# Patient Record
Sex: MALE | Race: WHITE | Employment: FULL TIME | URBAN - METROPOLITAN AREA
[De-identification: names, ages, dates, MRNs, and addresses within clinical notes are randomized per-mention and may not be internally consistent; named-entity substitution may affect disease eponyms.]

---

## 2018-07-06 ENCOUNTER — HOSPITAL ENCOUNTER (EMERGENCY)
Age: 48
Discharge: HOME | End: 2018-07-06
Attending: EMERGENCY MEDICINE
Payer: COMMERCIAL

## 2018-07-06 VITALS
HEART RATE: 90 BPM | RESPIRATION RATE: 14 BRPM | DIASTOLIC BLOOD PRESSURE: 100 MMHG | SYSTOLIC BLOOD PRESSURE: 166 MMHG | WEIGHT: 100 LBS | OXYGEN SATURATION: 96 % | BODY MASS INDEX: 19.63 KG/M2 | TEMPERATURE: 98.1 F | HEIGHT: 60 IN

## 2018-07-06 DIAGNOSIS — T16.2XXA FOREIGN BODY OF LEFT EAR, INITIAL ENCOUNTER: Primary | ICD-10-CM

## 2018-07-06 PROCEDURE — 99282 EMERGENCY DEPT VISIT SF MDM: CPT

## 2018-07-06 ASSESSMENT — PAIN SCALES - GENERAL: PAINLEVEL_OUTOF10: 10

## 2018-07-06 ASSESSMENT — PAIN DESCRIPTION - LOCATION: LOCATION: EAR

## 2018-07-06 ASSESSMENT — PAIN DESCRIPTION - ORIENTATION: ORIENTATION: LEFT

## 2023-03-03 LAB
ALANINE AMINOTRANSFERASE (SGPT) (U/L) IN SER/PLAS: 37 U/L (ref 10–52)
ALBUMIN (G/DL) IN SER/PLAS: 4.5 G/DL (ref 3.4–5)
ALBUMIN (MG/L) IN URINE: 39.4 MG/L
ALBUMIN/CREATININE (UG/MG) IN URINE: 22.4 UG/MG CRT (ref 0–30)
ALKALINE PHOSPHATASE (U/L) IN SER/PLAS: 54 U/L (ref 33–120)
ANION GAP IN SER/PLAS: 13 MMOL/L (ref 10–20)
ASPARTATE AMINOTRANSFERASE (SGOT) (U/L) IN SER/PLAS: 20 U/L (ref 9–39)
BILIRUBIN TOTAL (MG/DL) IN SER/PLAS: 0.7 MG/DL (ref 0–1.2)
CALCIUM (MG/DL) IN SER/PLAS: 9.3 MG/DL (ref 8.6–10.3)
CARBON DIOXIDE, TOTAL (MMOL/L) IN SER/PLAS: 28 MMOL/L (ref 21–32)
CHLORIDE (MMOL/L) IN SER/PLAS: 102 MMOL/L (ref 98–107)
CHOLESTEROL (MG/DL) IN SER/PLAS: 142 MG/DL (ref 0–199)
CHOLESTEROL IN HDL (MG/DL) IN SER/PLAS: 45.1 MG/DL
CHOLESTEROL/HDL RATIO: 3.1
CREATININE (MG/DL) IN SER/PLAS: 0.72 MG/DL (ref 0.5–1.3)
CREATININE (MG/DL) IN URINE: 176 MG/DL (ref 20–370)
ESTIMATED AVERAGE GLUCOSE FOR HBA1C: 126 MG/DL
GFR MALE: >90 ML/MIN/1.73M2
GLUCOSE (MG/DL) IN SER/PLAS: 93 MG/DL (ref 74–99)
HEMOGLOBIN A1C/HEMOGLOBIN TOTAL IN BLOOD: 6 %
LDL: 75 MG/DL (ref 0–99)
POTASSIUM (MMOL/L) IN SER/PLAS: 3.3 MMOL/L (ref 3.5–5.3)
PROTEIN TOTAL: 7.3 G/DL (ref 6.4–8.2)
SODIUM (MMOL/L) IN SER/PLAS: 140 MMOL/L (ref 136–145)
TRIGLYCERIDE (MG/DL) IN SER/PLAS: 108 MG/DL (ref 0–149)
UREA NITROGEN (MG/DL) IN SER/PLAS: 16 MG/DL (ref 6–23)
VLDL: 22 MG/DL (ref 0–40)

## 2023-04-10 ENCOUNTER — HOSPITAL ENCOUNTER (OUTPATIENT)
Dept: DATA CONVERSION | Facility: HOSPITAL | Age: 53
End: 2023-04-10
Attending: SURGERY | Admitting: SURGERY
Payer: COMMERCIAL

## 2023-04-10 DIAGNOSIS — E66.9 OBESITY, UNSPECIFIED: ICD-10-CM

## 2023-04-10 DIAGNOSIS — J45.909 UNSPECIFIED ASTHMA, UNCOMPLICATED (HHS-HCC): ICD-10-CM

## 2023-04-10 DIAGNOSIS — E11.9 TYPE 2 DIABETES MELLITUS WITHOUT COMPLICATIONS (MULTI): ICD-10-CM

## 2023-04-10 DIAGNOSIS — Z12.11 ENCOUNTER FOR SCREENING FOR MALIGNANT NEOPLASM OF COLON: ICD-10-CM

## 2023-04-10 DIAGNOSIS — Z79.84 LONG TERM (CURRENT) USE OF ORAL HYPOGLYCEMIC DRUGS: ICD-10-CM

## 2023-04-10 DIAGNOSIS — K57.30 DIVERTICULOSIS OF LARGE INTESTINE WITHOUT PERFORATION OR ABSCESS WITHOUT BLEEDING: ICD-10-CM

## 2023-04-10 DIAGNOSIS — D12.6 BENIGN NEOPLASM OF COLON, UNSPECIFIED: ICD-10-CM

## 2023-04-10 DIAGNOSIS — F32.A DEPRESSION, UNSPECIFIED: ICD-10-CM

## 2023-04-10 DIAGNOSIS — I10 ESSENTIAL (PRIMARY) HYPERTENSION: ICD-10-CM

## 2023-04-10 DIAGNOSIS — R06.02 SHORTNESS OF BREATH: ICD-10-CM

## 2023-04-10 DIAGNOSIS — G47.33 OBSTRUCTIVE SLEEP APNEA (ADULT) (PEDIATRIC): ICD-10-CM

## 2023-04-10 DIAGNOSIS — E78.5 HYPERLIPIDEMIA, UNSPECIFIED: ICD-10-CM

## 2023-04-10 DIAGNOSIS — F17.220 NICOTINE DEPENDENCE, CHEWING TOBACCO, UNCOMPLICATED: ICD-10-CM

## 2023-04-13 LAB
COMPLETE PATHOLOGY REPORT: NORMAL
CONVERTED CLINICAL DIAGNOSIS-HISTORY: NORMAL
CONVERTED FINAL DIAGNOSIS: NORMAL
CONVERTED FINAL REPORT PDF LINK TO COPY AND PASTE: NORMAL
CONVERTED GROSS DESCRIPTION: NORMAL

## 2023-08-24 PROBLEM — J06.9 VIRAL URI WITH COUGH: Status: ACTIVE | Noted: 2023-08-24

## 2023-08-24 PROBLEM — K76.0 NONALCOHOLIC FATTY LIVER DISEASE: Status: ACTIVE | Noted: 2023-08-24

## 2023-08-24 PROBLEM — R52 BODY ACHES: Status: ACTIVE | Noted: 2023-08-24

## 2023-08-24 PROBLEM — Z20.822 SUSPECTED COVID-19 VIRUS INFECTION: Status: ACTIVE | Noted: 2023-08-24

## 2023-08-24 PROBLEM — F17.210 DEPENDENCE ON NICOTINE FROM CIGARETTES: Status: ACTIVE | Noted: 2023-08-24

## 2023-08-24 PROBLEM — J45.40 MODERATE PERSISTENT ASTHMA WITHOUT COMPLICATION (HHS-HCC): Status: ACTIVE | Noted: 2023-08-24

## 2023-08-24 PROBLEM — F17.200 CURRENT SMOKER: Status: ACTIVE | Noted: 2023-08-24

## 2023-08-24 PROBLEM — E11.69 HYPERLIPIDEMIA ASSOCIATED WITH TYPE 2 DIABETES MELLITUS (MULTI): Status: ACTIVE | Noted: 2023-08-24

## 2023-08-24 PROBLEM — E78.5 HYPERLIPIDEMIA ASSOCIATED WITH TYPE 2 DIABETES MELLITUS (MULTI): Status: ACTIVE | Noted: 2023-08-24

## 2023-08-24 PROBLEM — E78.5 DYSLIPIDEMIA, GOAL LDL BELOW 70: Status: ACTIVE | Noted: 2023-08-24

## 2023-08-24 PROBLEM — I10 HYPERTENSION, BENIGN: Status: ACTIVE | Noted: 2023-08-24

## 2023-08-24 PROBLEM — G25.81 RESTLESS LEGS SYNDROME: Status: ACTIVE | Noted: 2023-08-24

## 2023-08-24 PROBLEM — G47.33 OSA ON CPAP: Status: ACTIVE | Noted: 2023-08-24

## 2023-08-24 PROBLEM — E11.65 TYPE 2 DIABETES MELLITUS WITH HYPERGLYCEMIA, WITHOUT LONG-TERM CURRENT USE OF INSULIN (MULTI): Status: ACTIVE | Noted: 2023-08-24

## 2023-08-24 RX ORDER — HYDROCHLOROTHIAZIDE 50 MG/1
1 TABLET ORAL DAILY
COMMUNITY
End: 2023-09-05 | Stop reason: SDUPTHER

## 2023-08-24 RX ORDER — BLOOD SUGAR DIAGNOSTIC
1 STRIP MISCELLANEOUS 2 TIMES DAILY
COMMUNITY
Start: 2022-09-19

## 2023-08-24 RX ORDER — FLUTICASONE PROPIONATE AND SALMETEROL 250; 50 UG/1; UG/1
1 POWDER RESPIRATORY (INHALATION) 2 TIMES DAILY
COMMUNITY
Start: 2021-07-19 | End: 2023-09-05 | Stop reason: SDUPTHER

## 2023-08-24 RX ORDER — LISINOPRIL 40 MG/1
1 TABLET ORAL DAILY
COMMUNITY
Start: 2022-08-05 | End: 2023-09-05 | Stop reason: SDUPTHER

## 2023-08-24 RX ORDER — LANCETS
1 EACH MISCELLANEOUS 2 TIMES DAILY
COMMUNITY
Start: 2022-09-21

## 2023-08-24 RX ORDER — ROSUVASTATIN CALCIUM 10 MG/1
1 TABLET, COATED ORAL NIGHTLY
COMMUNITY
End: 2023-09-05 | Stop reason: SDUPTHER

## 2023-08-24 RX ORDER — AMLODIPINE AND BENAZEPRIL HYDROCHLORIDE 10; 20 MG/1; MG/1
1 CAPSULE ORAL DAILY
COMMUNITY
Start: 2023-03-03 | End: 2023-09-05 | Stop reason: ALTCHOICE

## 2023-08-24 RX ORDER — ESCITALOPRAM OXALATE 10 MG/1
1 TABLET ORAL DAILY
COMMUNITY
Start: 2022-11-08 | End: 2023-09-05 | Stop reason: SDUPTHER

## 2023-08-24 RX ORDER — PRAMIPEXOLE DIHYDROCHLORIDE 0.12 MG/1
1 TABLET ORAL NIGHTLY
COMMUNITY
Start: 2022-08-05 | End: 2023-09-05 | Stop reason: SDUPTHER

## 2023-08-24 RX ORDER — LOSARTAN POTASSIUM 100 MG/1
1 TABLET ORAL DAILY
COMMUNITY
End: 2023-09-05 | Stop reason: SDUPTHER

## 2023-08-24 RX ORDER — CHLORTHALIDONE 25 MG/1
1 TABLET ORAL DAILY
COMMUNITY
Start: 2023-03-11 | End: 2023-09-05 | Stop reason: SDUPTHER

## 2023-08-24 RX ORDER — OSELTAMIVIR PHOSPHATE 75 MG/1
75 CAPSULE ORAL EVERY 12 HOURS
COMMUNITY
Start: 2019-12-31 | End: 2023-09-05 | Stop reason: ALTCHOICE

## 2023-08-24 RX ORDER — AMLODIPINE BESYLATE 5 MG/1
1 TABLET ORAL DAILY
COMMUNITY
End: 2023-09-05 | Stop reason: SDUPTHER

## 2023-08-24 RX ORDER — BUDESONIDE AND FORMOTEROL FUMARATE DIHYDRATE 160; 4.5 UG/1; UG/1
2 AEROSOL RESPIRATORY (INHALATION)
COMMUNITY
End: 2023-09-05 | Stop reason: SDUPTHER

## 2023-08-24 RX ORDER — METFORMIN HYDROCHLORIDE 1000 MG/1
1000 TABLET ORAL
COMMUNITY
Start: 2023-03-11 | End: 2023-09-05 | Stop reason: SDUPTHER

## 2023-09-05 ENCOUNTER — OFFICE VISIT (OUTPATIENT)
Dept: PRIMARY CARE | Facility: CLINIC | Age: 53
End: 2023-09-05
Payer: COMMERCIAL

## 2023-09-05 VITALS
HEIGHT: 69 IN | WEIGHT: 286 LBS | SYSTOLIC BLOOD PRESSURE: 120 MMHG | BODY MASS INDEX: 42.36 KG/M2 | DIASTOLIC BLOOD PRESSURE: 70 MMHG | HEART RATE: 66 BPM

## 2023-09-05 DIAGNOSIS — F32.5 MAJOR DEPRESSIVE DISORDER WITH SINGLE EPISODE, IN FULL REMISSION (CMS-HCC): ICD-10-CM

## 2023-09-05 DIAGNOSIS — F17.220 CHEWING TOBACCO NICOTINE DEPENDENCE WITHOUT COMPLICATION: ICD-10-CM

## 2023-09-05 DIAGNOSIS — E66.01 CLASS 3 SEVERE OBESITY DUE TO EXCESS CALORIES WITH SERIOUS COMORBIDITY AND BODY MASS INDEX (BMI) OF 40.0 TO 44.9 IN ADULT (MULTI): ICD-10-CM

## 2023-09-05 DIAGNOSIS — G25.81 RESTLESS LEGS SYNDROME: ICD-10-CM

## 2023-09-05 DIAGNOSIS — I10 HYPERTENSION, BENIGN: ICD-10-CM

## 2023-09-05 DIAGNOSIS — E11.69 HYPERLIPIDEMIA ASSOCIATED WITH TYPE 2 DIABETES MELLITUS (MULTI): ICD-10-CM

## 2023-09-05 DIAGNOSIS — F17.200 CURRENT SMOKER: ICD-10-CM

## 2023-09-05 DIAGNOSIS — E78.5 HYPERLIPIDEMIA ASSOCIATED WITH TYPE 2 DIABETES MELLITUS (MULTI): ICD-10-CM

## 2023-09-05 DIAGNOSIS — K76.0 NONALCOHOLIC FATTY LIVER DISEASE: ICD-10-CM

## 2023-09-05 DIAGNOSIS — G47.33 OSA ON CPAP: ICD-10-CM

## 2023-09-05 DIAGNOSIS — E11.65 TYPE 2 DIABETES MELLITUS WITH HYPERGLYCEMIA, WITHOUT LONG-TERM CURRENT USE OF INSULIN (MULTI): Primary | ICD-10-CM

## 2023-09-05 DIAGNOSIS — E78.5 DYSLIPIDEMIA, GOAL LDL BELOW 70: ICD-10-CM

## 2023-09-05 DIAGNOSIS — J45.40 MODERATE PERSISTENT ASTHMA WITHOUT COMPLICATION (HHS-HCC): ICD-10-CM

## 2023-09-05 DIAGNOSIS — F32.89 OTHER DEPRESSION: ICD-10-CM

## 2023-09-05 DIAGNOSIS — D12.6 TUBULAR ADENOMA OF COLON: ICD-10-CM

## 2023-09-05 DIAGNOSIS — M54.16 LUMBAR RADICULOPATHY, CHRONIC: ICD-10-CM

## 2023-09-05 PROBLEM — R52 BODY ACHES: Status: RESOLVED | Noted: 2023-08-24 | Resolved: 2023-09-05

## 2023-09-05 PROBLEM — J06.9 VIRAL URI WITH COUGH: Status: RESOLVED | Noted: 2023-08-24 | Resolved: 2023-09-05

## 2023-09-05 PROBLEM — E66.813 CLASS 3 SEVERE OBESITY DUE TO EXCESS CALORIES WITH SERIOUS COMORBIDITY AND BODY MASS INDEX (BMI) OF 40.0 TO 44.9 IN ADULT: Status: ACTIVE | Noted: 2023-09-05

## 2023-09-05 PROBLEM — Z20.822 SUSPECTED COVID-19 VIRUS INFECTION: Status: RESOLVED | Noted: 2023-08-24 | Resolved: 2023-09-05

## 2023-09-05 PROCEDURE — 3044F HG A1C LEVEL LT 7.0%: CPT | Performed by: INTERNAL MEDICINE

## 2023-09-05 PROCEDURE — 3078F DIAST BP <80 MM HG: CPT | Performed by: INTERNAL MEDICINE

## 2023-09-05 PROCEDURE — 4010F ACE/ARB THERAPY RXD/TAKEN: CPT | Performed by: INTERNAL MEDICINE

## 2023-09-05 PROCEDURE — 3008F BODY MASS INDEX DOCD: CPT | Performed by: INTERNAL MEDICINE

## 2023-09-05 PROCEDURE — 99215 OFFICE O/P EST HI 40 MIN: CPT | Performed by: INTERNAL MEDICINE

## 2023-09-05 PROCEDURE — 3074F SYST BP LT 130 MM HG: CPT | Performed by: INTERNAL MEDICINE

## 2023-09-05 RX ORDER — ROSUVASTATIN CALCIUM 10 MG/1
10 TABLET, COATED ORAL NIGHTLY
Qty: 90 TABLET | Refills: 3 | Status: SHIPPED | OUTPATIENT
Start: 2023-09-05

## 2023-09-05 RX ORDER — TIRZEPATIDE 2.5 MG/.5ML
2.5 INJECTION, SOLUTION SUBCUTANEOUS
Qty: 2 ML | Refills: 1 | Status: SHIPPED | OUTPATIENT
Start: 2023-09-05 | End: 2023-10-04

## 2023-09-05 RX ORDER — LISINOPRIL 40 MG/1
40 TABLET ORAL DAILY
Qty: 90 TABLET | Refills: 3 | Status: SHIPPED | OUTPATIENT
Start: 2023-09-05 | End: 2023-10-09 | Stop reason: SINTOL

## 2023-09-05 RX ORDER — CHLORTHALIDONE 25 MG/1
25 TABLET ORAL DAILY
Qty: 90 TABLET | Refills: 3 | Status: SHIPPED | OUTPATIENT
Start: 2023-09-05 | End: 2023-10-09 | Stop reason: SINTOL

## 2023-09-05 RX ORDER — AMLODIPINE BESYLATE 5 MG/1
5 TABLET ORAL DAILY
Qty: 90 TABLET | Refills: 3 | Status: SHIPPED | OUTPATIENT
Start: 2023-09-05 | End: 2023-10-09 | Stop reason: SINTOL

## 2023-09-05 RX ORDER — HYDROCHLOROTHIAZIDE 50 MG/1
50 TABLET ORAL DAILY
Qty: 90 TABLET | Refills: 3 | Status: SHIPPED | OUTPATIENT
Start: 2023-09-05 | End: 2023-09-05 | Stop reason: ALTCHOICE

## 2023-09-05 RX ORDER — CYCLOBENZAPRINE HCL 10 MG
10 TABLET ORAL NIGHTLY PRN
Qty: 30 TABLET | Refills: 0 | Status: SHIPPED | OUTPATIENT
Start: 2023-09-05 | End: 2023-09-26

## 2023-09-05 RX ORDER — BUDESONIDE AND FORMOTEROL FUMARATE DIHYDRATE 160; 4.5 UG/1; UG/1
2 AEROSOL RESPIRATORY (INHALATION)
Qty: 180 EACH | Refills: 3 | Status: SHIPPED | OUTPATIENT
Start: 2023-09-05

## 2023-09-05 RX ORDER — METFORMIN HYDROCHLORIDE 1000 MG/1
1000 TABLET ORAL
Qty: 180 TABLET | Refills: 3 | Status: SHIPPED | OUTPATIENT
Start: 2023-09-05

## 2023-09-05 RX ORDER — FLUTICASONE PROPIONATE AND SALMETEROL 250; 50 UG/1; UG/1
1 POWDER RESPIRATORY (INHALATION) 2 TIMES DAILY
Qty: 60 EACH | Refills: 3 | Status: SHIPPED | OUTPATIENT
Start: 2023-09-05 | End: 2023-12-15 | Stop reason: WASHOUT

## 2023-09-05 RX ORDER — ESCITALOPRAM OXALATE 10 MG/1
10 TABLET ORAL DAILY
Qty: 90 TABLET | Refills: 3 | Status: SHIPPED | OUTPATIENT
Start: 2023-09-05 | End: 2023-12-15 | Stop reason: WASHOUT

## 2023-09-05 RX ORDER — PRAMIPEXOLE DIHYDROCHLORIDE 0.12 MG/1
0.12 TABLET ORAL NIGHTLY
Qty: 90 TABLET | Refills: 3 | Status: SHIPPED | OUTPATIENT
Start: 2023-09-05

## 2023-09-05 RX ORDER — LOSARTAN POTASSIUM 100 MG/1
100 TABLET ORAL DAILY
Qty: 90 TABLET | Refills: 3 | Status: SHIPPED | OUTPATIENT
Start: 2023-09-05 | End: 2023-09-05 | Stop reason: ALTCHOICE

## 2023-09-05 ASSESSMENT — ENCOUNTER SYMPTOMS
LOSS OF SENSATION IN FEET: 0
OCCASIONAL FEELINGS OF UNSTEADINESS: 0
DEPRESSION: 0

## 2023-09-05 ASSESSMENT — PATIENT HEALTH QUESTIONNAIRE - PHQ9
2. FEELING DOWN, DEPRESSED OR HOPELESS: NOT AT ALL
SUM OF ALL RESPONSES TO PHQ9 QUESTIONS 1 AND 2: 0
1. LITTLE INTEREST OR PLEASURE IN DOING THINGS: NOT AT ALL

## 2023-09-05 ASSESSMENT — ANXIETY QUESTIONNAIRES
1. FEELING NERVOUS, ANXIOUS, OR ON EDGE: NOT AT ALL
5. BEING SO RESTLESS THAT IT IS HARD TO SIT STILL: NOT AT ALL
3. WORRYING TOO MUCH ABOUT DIFFERENT THINGS: NOT AT ALL
IF YOU CHECKED OFF ANY PROBLEMS ON THIS QUESTIONNAIRE, HOW DIFFICULT HAVE THESE PROBLEMS MADE IT FOR YOU TO DO YOUR WORK, TAKE CARE OF THINGS AT HOME, OR GET ALONG WITH OTHER PEOPLE: NOT DIFFICULT AT ALL
7. FEELING AFRAID AS IF SOMETHING AWFUL MIGHT HAPPEN: NOT AT ALL
4. TROUBLE RELAXING: NOT AT ALL
6. BECOMING EASILY ANNOYED OR IRRITABLE: NOT AT ALL
GAD7 TOTAL SCORE: 0
2. NOT BEING ABLE TO STOP OR CONTROL WORRYING: NOT AT ALL

## 2023-09-05 NOTE — ASSESSMENT & PLAN NOTE
Stable on pramipexole low-dose may consider switch to gabapentin in the setting of chronic lumbar radiculopathy

## 2023-09-05 NOTE — ASSESSMENT & PLAN NOTE
Blood pressure well controlled at this time continue amlodipine 5 mg daily with lisinopril 40 mg daily and chlorthalidone 25 mg daily reevaluate with next blood work

## 2023-09-05 NOTE — ASSESSMENT & PLAN NOTE
Patient quit smoking 5 years ago but dips chewing tobacco about 1 can a week no evidence of any oral pathology at this time given nicotine cessation education   Stage 2: Additional Anesthesia Type: 1% lidocaine with epinephrine

## 2023-09-05 NOTE — ASSESSMENT & PLAN NOTE
Continue rosuvastatin 10 mg daily reevaluate lipid profile with next visit continue baby aspirin therapy 81 mg daily

## 2023-09-05 NOTE — ASSESSMENT & PLAN NOTE
Longstanding nature we will start cyclobenzaprine 10 mg nightly check x-ray films of lumbar spine particularly around tailbone advocate for at diet exercise and weight loss and nerve conduction studies of lower legs to at evaluate compressive neuropathy further no history of back injury

## 2023-09-05 NOTE — PROGRESS NOTES
"Subjective   Reason for Visit: Joe Lopez is an 53 y.o. male here for a  fu visit.     Past Medical, Surgical, and Family History reviewed and updated in chart.    Reviewed all medications by prescribing practitioner or clinical pharmacist (such as prescriptions, OTCs, herbal therapies and supplements) and documented in the medical record.    HPI    Patient Care Team:  Jeremiah Mccabe DO as PCP - General  Jeremiah Mccabe DO [facility] PCP [facility]     Review of Systems   All other systems reviewed and are negative.      Objective   Vitals:  /70 (BP Location: Left arm, Patient Position: Sitting)   Pulse 66   Ht 1.753 m (5' 9\")   Wt 130 kg (286 lb)   BMI 42.23 kg/m²       Physical Exam  Vitals and nursing note reviewed.   Constitutional:       General: He is not in acute distress.     Appearance: Normal appearance. He is well-developed. He is obese. He is not toxic-appearing.   HENT:      Head: Normocephalic and atraumatic.      Right Ear: Tympanic membrane and external ear normal.      Left Ear: Tympanic membrane and external ear normal.      Nose: Nose normal.      Mouth/Throat:      Mouth: Mucous membranes are moist.      Pharynx: Oropharynx is clear. No oropharyngeal exudate or posterior oropharyngeal erythema.      Tonsils: No tonsillar exudate. 2+ on the right. 2+ on the left.   Eyes:      Extraocular Movements: Extraocular movements intact.      Conjunctiva/sclera: Conjunctivae normal.   Cardiovascular:      Rate and Rhythm: Normal rate and regular rhythm.      Pulses: Normal pulses.      Heart sounds: Normal heart sounds. No murmur heard.  Pulmonary:      Effort: Pulmonary effort is normal.      Breath sounds: Normal breath sounds.   Abdominal:      General: Abdomen is flat. Bowel sounds are normal.      Palpations: Abdomen is soft.   Musculoskeletal:      Cervical back: Neck supple.   Feet:      Right foot:      Skin integrity: Skin integrity normal. No ulcer, blister, skin breakdown, " erythema, warmth or callus.      Toenail Condition: Right toenails are normal.      Left foot:      Skin integrity: Skin integrity normal. No ulcer, blister, skin breakdown, erythema, warmth or callus.      Toenail Condition: Left toenails are normal.   Lymphadenopathy:      Cervical: No cervical adenopathy.   Skin:     General: Skin is warm and dry.      Capillary Refill: Capillary refill takes more than 3 seconds.      Findings: No rash.   Neurological:      Mental Status: He is alert. Mental status is at baseline.      Sensory: Sensation is intact.   Psychiatric:         Mood and Affect: Mood normal.         Behavior: Behavior normal.         Thought Content: Thought content normal.         Judgment: Judgment normal.         Assessment/Plan   Problem List Items Addressed This Visit       RESOLVED: Current smoker    Relevant Medications    cyclobenzaprine (Flexeril) 10 mg tablet    tirzepatide (Mounjaro) 2.5 mg/0.5 mL pen injector    Other Relevant Orders    EMG & nerve conduction    XR lumbar spine 2-3 views    Comprehensive Metabolic Panel    Hemoglobin A1C    Lipid Panel    Albumin , Urine Random    Follow [facility] - PCP - Established    Follow [facility] - Pharmacy    Hepatitis C antibody    Hyperlipidemia associated with type 2 diabetes mellitus (CMS/HCC)    Current Assessment & Plan     Continue rosuvastatin 10 mg daily reevaluate lipid profile with next visit continue baby aspirin therapy 81 mg daily         Relevant Medications    rosuvastatin (Crestor) 10 mg tablet    cyclobenzaprine (Flexeril) 10 mg tablet    tirzepatide (Mounjaro) 2.5 mg/0.5 mL pen injector    Other Relevant Orders    EMG & nerve conduction    XR lumbar spine 2-3 views    Comprehensive Metabolic Panel    Hemoglobin A1C    Lipid Panel    Albumin , Urine Random    [facility] - PCP - Established    Follow [facility] - Pharmacy    Hepatitis C antibody     Hypertension, benign    Current Assessment & Plan     Blood pressure well controlled at this time continue amlodipine 5 mg daily with lisinopril 40 mg daily and chlorthalidone 25 mg daily reevaluate with next blood work         Relevant Medications    amLODIPine (Norvasc) 5 mg tablet    chlorthalidone (Hygroton) 25 mg tablet    lisinopril 40 mg tablet    cyclobenzaprine (Flexeril) 10 mg tablet    tirzepatide (Mounjaro) 2.5 mg/0.5 mL pen injector    Other Relevant Orders    EMG & nerve conduction    XR lumbar spine 2-3 views    Comprehensive Metabolic Panel    Hemoglobin A1C    Lipid Panel    Albumin , Urine Random    Follow [facility] - PCP - Established    Follow [facility] - Pharmacy    Hepatitis C antibody    Moderate persistent asthma without complication    Current Assessment & Plan     Stable well-controlled Symbicort continue patient refusing influenza vaccine and COVID vaccines at this time         Relevant Medications    budesonide-formoteroL (Symbicort) 160-4.5 mcg/actuation inhaler    fluticasone propion-salmeteroL (Advair Diskus) 250-50 mcg/dose diskus inhaler    cyclobenzaprine (Flexeril) 10 mg tablet    tirzepatide (Mounjaro) 2.5 mg/0.5 mL pen injector    Other Relevant Orders    EMG & nerve conduction    XR lumbar spine 2-3 views    Comprehensive Metabolic Panel    Hemoglobin A1C    Lipid Panel    Albumin , Urine Random    Follow [facility] - PCP - Established    Follow [facility] - Pharmacy    Hepatitis C antibody    Nonalcoholic fatty liver disease    Current Assessment & Plan     Reevaluate left with lab work in the setting of morbid obesity         PAPO on CPAP    Current Assessment & Plan     Compliant with regular use of CPAP on a nightly basis         Relevant Medications    cyclobenzaprine (Flexeril) 10 mg tablet    tirzepatide (Mounjaro) 2.5 mg/0.5 mL pen injector    Other Relevant Orders    EMG & nerve conduction    XR lumbar  spine 2-3 views    Comprehensive Metabolic Panel    Hemoglobin A1C    Lipid Panel    Albumin , Urine Random    Follow [facility] - PCP - Established    Follow Up [facility] - Pharmacy    Hepatitis C antibody    Restless legs syndrome    Current Assessment & Plan     Stable on pramipexole low-dose may consider switch to gabapentin in the setting of chronic lumbar radiculopathy         Relevant Medications    pramipexole (Mirapex) 0.125 mg tablet    cyclobenzaprine (Flexeril) 10 mg tablet    tirzepatide (Mounjaro) 2.5 mg/0.5 mL pen injector    Other Relevant Orders    EMG & nerve conduction    XR lumbar spine 2-3 views    Comprehensive Metabolic Panel    Hemoglobin A1C    Lipid Panel    Albumin , Urine Random    Follow Up [facility] - PCP - Established    Follow Up [facility] - Pharmacy    Hepatitis C antibody    Type 2 diabetes mellitus with hyperglycemia, without long-term current use of insulin (CMS/[facility]) - Primary    Current Assessment & Plan     In the setting of morbid obesity continue metformin 1000 mg twice a day and add Mounjaro 2.5 mg subcu weekly taper with consultation to pharmacy for titration reevaluate 3 months         Relevant Medications    metFORMIN (Glucophage) 1,000 mg tablet    cyclobenzaprine (Flexeril) 10 mg tablet    tirzepatide (Mounjaro) 2.5 mg/0.5 mL pen injector    Other Relevant Orders    EMG & nerve conduction    XR lumbar spine 2-3 views    Comprehensive Metabolic Panel    Hemoglobin A1C    Lipid Panel    Albumin , Urine Random    Follow Up In [facility] - PCP - Established    Follow Up In [facility] - Pharmacy    Hepatitis C antibody    Class 3 severe obesity due to excess calories with serious comorbidity and body mass index (BMI) of 40.0 to 44.9 in adult (CMS/[facility])    Current Assessment & Plan     Initiate GLP-1 agonist therapy with Mounjaro 2.5 mg subcu weekly with assistance from pharmacy reevaluate with  next visit         Relevant Medications    cyclobenzaprine (Flexeril) 10 mg tablet    tirzepatide (Mounjaro) 2.5 mg/0.5 mL pen injector    Other Relevant Orders    EMG & nerve conduction    XR lumbar spine 2-3 views    Comprehensive Metabolic Panel    Hemoglobin A1C    Lipid Panel    Albumin , Urine Random    Follow Up [facility] - PCP - Established    Follow Up [facility] - Pharmacy    Hepatitis C antibody    Chewing tobacco nicotine dependence without complication    Current Assessment & Plan     Patient quit smoking 5 years ago but dips chewing tobacco about 1 can a week no evidence of any oral pathology at this time given nicotine cessation education         Relevant Medications    cyclobenzaprine (Flexeril) 10 mg tablet    tirzepatide (Mounjaro) 2.5 mg/0.5 mL pen injector    Other Relevant Orders    EMG & nerve conduction    XR lumbar spine 2-3 views    Comprehensive Metabolic Panel    Hemoglobin A1C    Lipid Panel    Albumin , Urine Random    Follow Up In [facility] - PCP - Established    Follow Up In [facility] - Pharmacy    Hepatitis C antibody    Lumbar radiculopathy, chronic    Current Assessment & Plan     Longstanding nature we will start cyclobenzaprine 10 mg nightly check x-ray films of lumbar spine particularly around tailbone advocate for at diet exercise and weight loss and nerve conduction studies of lower legs to at evaluate compressive neuropathy further no history of back injury         Relevant Medications    cyclobenzaprine (Flexeril) 10 mg tablet    tirzepatide (Mounjaro) 2.5 mg/0.5 mL pen injector    Other Relevant Orders    EMG & nerve conduction    XR lumbar spine 2-3 views    Comprehensive Metabolic Panel    Hemoglobin A1C    Lipid Panel    Albumin , Urine Random    Follow Up [facility] - PCP - Established    Follow Up In [facility] - Pharmacy    Hepatitis C antibody    Tubular adenoma of colon    Current  Assessment & Plan     Colonoscopy completed in April 2023 by Dr. ESTEBAN 3-year surveillance suggested         Major depressive disorder with single episode, in full remission (CMS/HCC)    Current Assessment & Plan     Currently in remission on escitalopram 10 mg daily continue          Other Visit Diagnoses       Dyslipidemia, goal LDL below 70        Relevant Medications    cyclobenzaprine (Flexeril) 10 mg tablet    tirzepatide (Mounjaro) 2.5 mg/0.5 mL pen injector    Other Relevant Orders    EMG & nerve conduction    XR lumbar spine 2-3 views    Comprehensive Metabolic Panel    Hemoglobin A1C    Lipid Panel    Albumin , Urine Random    Follow Up [facility] - PCP - Established    Follow Up [facility] - Pharmacy    Hepatitis C antibody    Other depression        Relevant Medications    escitalopram (Lexapro) 10 mg tablet    cyclobenzaprine (Flexeril) 10 mg tablet    tirzepatide (Mounjaro) 2.5 mg/0.5 mL pen injector    Other Relevant Orders    EMG & nerve conduction    XR lumbar spine 2-3 views    Comprehensive Metabolic Panel    Hemoglobin A1C    Lipid Panel    Albumin , Urine Random    Follow Up In [facility] - PCP - Established    Follow Up In [facility] - Pharmacy    Hepatitis C antibody

## 2023-09-05 NOTE — ASSESSMENT & PLAN NOTE
In the setting of morbid obesity continue metformin 1000 mg twice a day and add Mounjaro 2.5 mg subcu weekly taper with consultation to pharmacy for titration reevaluate 3 months

## 2023-09-05 NOTE — PROGRESS NOTES
"Subjective   Patient ID: Joe Lopez is a 53 y.o. male who presents for Follow-up.    HPI     Review of Systems    Objective   /70 (BP Location: Left arm, Patient Position: Sitting)   Pulse 66   Ht 1.753 m (5' 9\")   Wt 130 kg (286 lb)   BMI 42.23 kg/m²     Physical Exam    Assessment/Plan          "

## 2023-09-05 NOTE — ASSESSMENT & PLAN NOTE
Stable well-controlled Symbicort continue patient refusing influenza vaccine and COVID vaccines at this time

## 2023-09-05 NOTE — ASSESSMENT & PLAN NOTE
Initiate GLP-1 agonist therapy with Mounjaro 2.5 mg subcu weekly with assistance from pharmacy reevaluate with next visit

## 2023-09-08 ENCOUNTER — TELEMEDICINE (OUTPATIENT)
Dept: PHARMACY | Facility: HOSPITAL | Age: 53
End: 2023-09-08
Payer: COMMERCIAL

## 2023-09-08 DIAGNOSIS — G25.81 RESTLESS LEGS SYNDROME: ICD-10-CM

## 2023-09-08 DIAGNOSIS — E66.01 CLASS 3 SEVERE OBESITY DUE TO EXCESS CALORIES WITH SERIOUS COMORBIDITY AND BODY MASS INDEX (BMI) OF 40.0 TO 44.9 IN ADULT (MULTI): ICD-10-CM

## 2023-09-08 DIAGNOSIS — F17.200 CURRENT SMOKER: ICD-10-CM

## 2023-09-08 DIAGNOSIS — M54.16 LUMBAR RADICULOPATHY, CHRONIC: ICD-10-CM

## 2023-09-08 DIAGNOSIS — F17.220 CHEWING TOBACCO NICOTINE DEPENDENCE WITHOUT COMPLICATION: ICD-10-CM

## 2023-09-08 DIAGNOSIS — E11.65 TYPE 2 DIABETES MELLITUS WITH HYPERGLYCEMIA, WITHOUT LONG-TERM CURRENT USE OF INSULIN (MULTI): ICD-10-CM

## 2023-09-08 DIAGNOSIS — E11.69 HYPERLIPIDEMIA ASSOCIATED WITH TYPE 2 DIABETES MELLITUS (MULTI): ICD-10-CM

## 2023-09-08 DIAGNOSIS — E78.5 HYPERLIPIDEMIA ASSOCIATED WITH TYPE 2 DIABETES MELLITUS (MULTI): ICD-10-CM

## 2023-09-08 DIAGNOSIS — E78.5 DYSLIPIDEMIA, GOAL LDL BELOW 70: ICD-10-CM

## 2023-09-08 DIAGNOSIS — F32.89 OTHER DEPRESSION: ICD-10-CM

## 2023-09-08 DIAGNOSIS — I10 HYPERTENSION, BENIGN: ICD-10-CM

## 2023-09-08 DIAGNOSIS — G47.33 OSA ON CPAP: ICD-10-CM

## 2023-09-08 DIAGNOSIS — J45.40 MODERATE PERSISTENT ASTHMA WITHOUT COMPLICATION (HHS-HCC): ICD-10-CM

## 2023-09-08 ASSESSMENT — ENCOUNTER SYMPTOMS: DIABETIC ASSOCIATED SYMPTOMS: 0

## 2023-09-08 NOTE — Clinical Note
Plan to titrate Mounjaro monthly with patient. Patient may contact the office regarding recent cyclobenzaprine start which he believes is causing muscle pain.

## 2023-09-08 NOTE — PROGRESS NOTES
Pharmacy Clinic Note    Joe Lopez is a 53 y.o. male was referred to Clinical Pharmacy Team for diabetes management.     Referring Provider: Jeremiah Mccabe, DO    Subjective   No Known Allergies    [facility]/pharmacy #[id number] - [place], [place] - [address] [place] [address] AT [id number]  Phone: [phone] Fax: [phone]      Diabetes  He presents for his initial diabetic visit. He has type 2 diabetes mellitus. His disease course has been stable. There are no hypoglycemic associated symptoms. There are no diabetic associated symptoms. There are no hypoglycemic complications. Risk factors for coronary artery disease include diabetes mellitus, male sex, tobacco exposure and obesity. Current diabetic treatments: Metformin 1000 mg BID; Mounjaro 2.5 mg weekly (to start 9/9) He is compliant with treatment all of the time.       Objective     There were no vitals taken for this visit.     LAB  Lab Results   Component Value Date    BILITOT 0.7 03/03/2023    CALCIUM 9.3 03/03/2023    CO2 28 03/03/2023     03/03/2023    CREATININE 0.72 03/03/2023    GLUCOSE 93 03/03/2023    ALKPHOS 54 03/03/2023    K 3.3 (L) 03/03/2023    PROT 7.3 03/03/2023     03/03/2023    AST 20 03/03/2023    ALT 37 03/03/2023    BUN 16 03/03/2023    ANIONGAP 13 03/03/2023    MG 2.01 12/31/2019    ALBUMIN 4.5 03/03/2023    GFRMALE >90 03/03/2023     Lab Results   Component Value Date    TRIG 108 03/03/2023    CHOL 142 03/03/2023    HDL 45.1 03/03/2023     Lab Results   Component Value Date    HGBA1C 6.0 (A) 03/03/2023       Current Outpatient Medications on File Prior to Visit   Medication Sig Dispense Refill   • amLODIPine (Norvasc) 5 mg tablet Take 1 tablet (5 mg) by mouth once daily. 90 tablet 3   • budesonide-formoteroL (Symbicort) 160-4.5 mcg/actuation inhaler Inhale 2 puffs 2 times a day. 180 each 3   • chlorthalidone (Hygroton) 25 mg tablet Take 1 tablet (25 mg) by mouth once daily. 90 tablet 3   •  Pt repositioned to left side. RT in to suction pt for thick secretions. Pt noted to have gag reflex. Atropine given as well as Morphine 0.5 ml. Family remains at bedside. cyclobenzaprine (Flexeril) 10 mg tablet Take 1 tablet (10 mg) by mouth as needed at bedtime for muscle spasms. 30 tablet 0   • escitalopram (Lexapro) 10 mg tablet Take 1 tablet (10 mg) by mouth once daily. 90 tablet 3   • fluticasone propion-salmeteroL (Advair Diskus) 250-50 mcg/dose diskus inhaler Inhale 1 puff 2 times a day. 60 each 3   • lisinopril 40 mg tablet Take 1 tablet (40 mg) by mouth once daily. 90 tablet 3   • metFORMIN (Glucophage) 1,000 mg tablet Take 1 tablet (1,000 mg) by mouth 2 times a day with meals. 180 tablet 3   • OneTouch Ultra Test strip 1 strip 2 times a day.     • OneTouch UltraSoft Lancets misc 1 each 2 times a day.     • pramipexole (Mirapex) 0.125 mg tablet Take 1 tablet (0.125 mg) by mouth once daily at bedtime. 90 tablet 3   • rosuvastatin (Crestor) 10 mg tablet Take 1 tablet (10 mg) by mouth once daily at bedtime. 90 tablet 3   • tirzepatide (Mounjaro) 2.5 mg/0.5 mL pen injector Inject 2.5 mg under the skin 1 (one) time per week. 2 mL 1   • [DISCONTINUED] amLODIPine (Norvasc) 5 mg tablet Take 1 tablet (5 mg) by mouth once daily.     • [DISCONTINUED] amLODIPine-benazepriL (Lotrel) 10-20 mg capsule Take 1 capsule by mouth once daily.     • [DISCONTINUED] budesonide-formoteroL (Symbicort) 160-4.5 mcg/actuation inhaler Inhale 2 puffs 2 times a day.     • [DISCONTINUED] chlorthalidone (Hygroton) 25 mg tablet Take 1 tablet (25 mg) by mouth once daily.     • [DISCONTINUED] escitalopram (Lexapro) 10 mg tablet Take 1 tablet (10 mg) by mouth once daily.     • [DISCONTINUED] fluticasone propion-salmeteroL (Advair Diskus) 250-50 mcg/dose diskus inhaler Inhale 1 puff 2 times a day.     • [DISCONTINUED] hydroCHLOROthiazide (HYDRODiuril) 50 mg tablet Take 1 tablet (50 mg) by mouth once daily.     • [DISCONTINUED] hydroCHLOROthiazide (HYDRODiuril) 50 mg tablet Take 1 tablet (50 mg) by mouth once daily. 90 tablet 3   • [DISCONTINUED] lisinopril 40 mg tablet Take 1 tablet (40 mg) by mouth once daily.      • [DISCONTINUED] losartan (Cozaar) 100 mg tablet Take 1 tablet (100 mg) by mouth once daily.     • [DISCONTINUED] losartan (Cozaar) 100 mg tablet Take 1 tablet (100 mg) by mouth once daily. 90 tablet 3   • [DISCONTINUED] metFORMIN (Glucophage) 1,000 mg tablet Take 1 tablet (1,000 mg) by mouth 2 times a day with meals.     • [DISCONTINUED] oseltamivir (Tamiflu) 75 mg capsule Take 1 capsule (75 mg) by mouth every 12 hours.     • [DISCONTINUED] pramipexole (Mirapex) 0.125 mg tablet Take 1 tablet (0.125 mg) by mouth once daily at bedtime.     • [DISCONTINUED] rosuvastatin (Crestor) 10 mg tablet Take 1 tablet (10 mg) by mouth once daily at bedtime.       No current facility-administered medications on file prior to visit.        DRUG INTERACTIONS  - No significant drug-drug interactions exist that require change in therapy  _______________________________________________________________________  PATIENT EDUCATION/GOALS    1. Discussed disease specific goals:   - Fasting B - 130 mg/dL  - Postprandial BG: less than 180 mg/dL  - A1c: less than 7%    2. Patient established with pharmacy clinic for the titration of Mounjaro. Patient has received first box of 2.5 mg and plans to start medication on . Education below provided for administration. Our next follow up will be in three weeks to discuss titration to 5 mg.    Mounjaro Education:     - Counseled patient on MOA, expectations, side effects, duration of therapy, contraindications, administration, and monitoring parameters  - Answered all patient questions and concerns  - Counseled patient on Mounjaro MOA, expectations, side effects, duration of therapy, administration, and monitoring parameters.  - Provided detailed dosing and administration counseling to ensure proper technique.   - Reviewed Mounjaro titration schedule, starting with 2.5 mg once weekly to a goal of 15 mg once weekly if tolerated  - Counseled patient on the benefits of GLP-1ra glycemic control  and weight loss  - Reviewed storage requirements of Mounjaro when not in use, and when to administer the medication if a dose is missed.  - Advised patient that they may experience improved satiety after meals and portion sizes of meals may be reduced as doses of Mounjaro increase.     3. Patient also states new worsening of muscle pain following cyclobenzaprine start. I advised him to hold his doses for today to assess whether this improves. Pt to reach out to PCP if pain continues.  _______________________________________________________________________    Assessment/Plan   Problem List Items Addressed This Visit          Cardiac and Vasculature    Hyperlipidemia associated with type 2 diabetes mellitus (CMS/[facility])    Hypertension, benign       Endocrine/Metabolic    Type 2 diabetes mellitus with hyperglycemia, without long-term current use of insulin (CMS/[facility])    Class 3 severe obesity due to excess calories with serious comorbidity and body mass index (BMI) of 40.0 to 44.9 in adult (CMS/[facility])       Neuro    Restless legs syndrome    Lumbar radiculopathy, chronic       Pulmonary and Pneumonias    Moderate persistent asthma without complication       Sleep    PAPO on CPAP       Tobacco    Chewing tobacco nicotine dependence without complication     Other Visit Diagnoses       Dyslipidemia, goal LDL below 70        Other depression        Current smoker                 Continue all meds under the continuation of care with the referring provider and clinical pharmacy team.    Clinical Pharmacist follow-up: 9/25/23    Avinash Fay, PharmD     Verbal consent to manage patient's drug therapy was obtained from [the patient and/or an individual authorized to act on behalf of a patient]. They were informed they may decline to participate or withdraw from participation in pharmacy services at any time.

## 2023-09-18 ENCOUNTER — TELEMEDICINE (OUTPATIENT)
Dept: PHARMACY | Facility: HOSPITAL | Age: 53
End: 2023-09-18
Payer: COMMERCIAL

## 2023-09-18 DIAGNOSIS — E11.65 TYPE 2 DIABETES MELLITUS WITH HYPERGLYCEMIA, WITHOUT LONG-TERM CURRENT USE OF INSULIN (MULTI): ICD-10-CM

## 2023-09-18 ASSESSMENT — ENCOUNTER SYMPTOMS: DIABETIC ASSOCIATED SYMPTOMS: 0

## 2023-09-19 ENCOUNTER — TELEPHONE (OUTPATIENT)
Dept: PRIMARY CARE | Facility: CLINIC | Age: 53
End: 2023-09-19
Payer: COMMERCIAL

## 2023-09-25 ENCOUNTER — APPOINTMENT (OUTPATIENT)
Dept: PHARMACY | Facility: HOSPITAL | Age: 53
End: 2023-09-25
Payer: COMMERCIAL

## 2023-09-26 DIAGNOSIS — E66.01 CLASS 3 SEVERE OBESITY DUE TO EXCESS CALORIES WITH SERIOUS COMORBIDITY AND BODY MASS INDEX (BMI) OF 40.0 TO 44.9 IN ADULT (MULTI): ICD-10-CM

## 2023-09-26 DIAGNOSIS — G25.81 RESTLESS LEGS SYNDROME: ICD-10-CM

## 2023-09-26 DIAGNOSIS — F17.200 CURRENT SMOKER: ICD-10-CM

## 2023-09-26 DIAGNOSIS — E78.5 HYPERLIPIDEMIA ASSOCIATED WITH TYPE 2 DIABETES MELLITUS (MULTI): ICD-10-CM

## 2023-09-26 DIAGNOSIS — E11.69 HYPERLIPIDEMIA ASSOCIATED WITH TYPE 2 DIABETES MELLITUS (MULTI): ICD-10-CM

## 2023-09-26 DIAGNOSIS — I10 HYPERTENSION, BENIGN: ICD-10-CM

## 2023-09-26 DIAGNOSIS — E11.65 TYPE 2 DIABETES MELLITUS WITH HYPERGLYCEMIA, WITHOUT LONG-TERM CURRENT USE OF INSULIN (MULTI): ICD-10-CM

## 2023-09-26 DIAGNOSIS — F32.89 OTHER DEPRESSION: ICD-10-CM

## 2023-09-26 DIAGNOSIS — G47.33 OSA ON CPAP: ICD-10-CM

## 2023-09-26 DIAGNOSIS — F17.220 CHEWING TOBACCO NICOTINE DEPENDENCE WITHOUT COMPLICATION: ICD-10-CM

## 2023-09-26 DIAGNOSIS — M54.16 LUMBAR RADICULOPATHY, CHRONIC: ICD-10-CM

## 2023-09-26 DIAGNOSIS — E78.5 DYSLIPIDEMIA, GOAL LDL BELOW 70: ICD-10-CM

## 2023-09-26 DIAGNOSIS — J45.40 MODERATE PERSISTENT ASTHMA WITHOUT COMPLICATION (HHS-HCC): ICD-10-CM

## 2023-09-26 RX ORDER — CYCLOBENZAPRINE HCL 10 MG
10 TABLET ORAL NIGHTLY PRN
Qty: 30 TABLET | Refills: 0 | Status: SHIPPED | OUTPATIENT
Start: 2023-09-26 | End: 2024-02-23

## 2023-10-04 DIAGNOSIS — G47.33 OSA ON CPAP: ICD-10-CM

## 2023-10-04 DIAGNOSIS — E78.5 DYSLIPIDEMIA, GOAL LDL BELOW 70: ICD-10-CM

## 2023-10-04 DIAGNOSIS — J45.40 MODERATE PERSISTENT ASTHMA WITHOUT COMPLICATION (HHS-HCC): ICD-10-CM

## 2023-10-04 DIAGNOSIS — E66.01 CLASS 3 SEVERE OBESITY DUE TO EXCESS CALORIES WITH SERIOUS COMORBIDITY AND BODY MASS INDEX (BMI) OF 40.0 TO 44.9 IN ADULT (MULTI): ICD-10-CM

## 2023-10-04 DIAGNOSIS — G25.81 RESTLESS LEGS SYNDROME: ICD-10-CM

## 2023-10-04 DIAGNOSIS — E11.65 TYPE 2 DIABETES MELLITUS WITH HYPERGLYCEMIA, WITHOUT LONG-TERM CURRENT USE OF INSULIN (MULTI): ICD-10-CM

## 2023-10-04 DIAGNOSIS — F32.89 OTHER DEPRESSION: ICD-10-CM

## 2023-10-04 DIAGNOSIS — E11.69 HYPERLIPIDEMIA ASSOCIATED WITH TYPE 2 DIABETES MELLITUS (MULTI): ICD-10-CM

## 2023-10-04 DIAGNOSIS — I10 HYPERTENSION, BENIGN: ICD-10-CM

## 2023-10-04 DIAGNOSIS — E78.5 HYPERLIPIDEMIA ASSOCIATED WITH TYPE 2 DIABETES MELLITUS (MULTI): ICD-10-CM

## 2023-10-04 DIAGNOSIS — F17.200 CURRENT SMOKER: ICD-10-CM

## 2023-10-04 DIAGNOSIS — M54.16 LUMBAR RADICULOPATHY, CHRONIC: ICD-10-CM

## 2023-10-04 DIAGNOSIS — F17.220 CHEWING TOBACCO NICOTINE DEPENDENCE WITHOUT COMPLICATION: ICD-10-CM

## 2023-10-05 RX ORDER — TIRZEPATIDE 2.5 MG/.5ML
2.5 INJECTION, SOLUTION SUBCUTANEOUS
Qty: 2 ML | Refills: 1 | Status: SHIPPED | OUTPATIENT
Start: 2023-10-05 | End: 2023-10-18 | Stop reason: CLARIF

## 2023-10-06 ENCOUNTER — TELEPHONE (OUTPATIENT)
Dept: PRIMARY CARE | Facility: CLINIC | Age: 53
End: 2023-10-06
Payer: COMMERCIAL

## 2023-10-06 DIAGNOSIS — I10 HYPERTENSION, BENIGN: Primary | ICD-10-CM

## 2023-10-06 NOTE — TELEPHONE ENCOUNTER
Patient called to report that he stopped taking his Amlodipine, Chlorthalidone and Lisinopril due to leg and arm pain. Patient stated the pain started shortly after taking them. Patient last took the medication a few weeks ago and within a week the pain subsided.

## 2023-10-09 ENCOUNTER — APPOINTMENT (OUTPATIENT)
Dept: PHARMACY | Facility: HOSPITAL | Age: 53
End: 2023-10-09
Payer: COMMERCIAL

## 2023-10-09 RX ORDER — HYDROCHLOROTHIAZIDE 50 MG/1
50 TABLET ORAL DAILY
Qty: 30 TABLET | Refills: 5 | Status: SHIPPED | OUTPATIENT
Start: 2023-10-09 | End: 2024-04-11

## 2023-10-09 RX ORDER — LOSARTAN POTASSIUM 100 MG/1
100 TABLET ORAL DAILY
Qty: 30 TABLET | Refills: 5 | Status: SHIPPED | OUTPATIENT
Start: 2023-10-09 | End: 2024-02-23

## 2023-10-09 NOTE — TELEPHONE ENCOUNTER
Patient stopped by office says that he also stopped the escitalopram and he wishes to go back on his other blood pressure medications states he never had any issues with them Looks like he was on Hydrochlorothiazide 50mg and losartan 100mg

## 2023-10-13 ENCOUNTER — LAB (OUTPATIENT)
Dept: LAB | Facility: LAB | Age: 53
End: 2023-10-13
Payer: COMMERCIAL

## 2023-10-13 DIAGNOSIS — M54.16 LUMBAR RADICULOPATHY, CHRONIC: ICD-10-CM

## 2023-10-13 DIAGNOSIS — E66.01 CLASS 3 SEVERE OBESITY DUE TO EXCESS CALORIES WITH SERIOUS COMORBIDITY AND BODY MASS INDEX (BMI) OF 40.0 TO 44.9 IN ADULT (MULTI): ICD-10-CM

## 2023-10-13 DIAGNOSIS — J45.40 MODERATE PERSISTENT ASTHMA WITHOUT COMPLICATION (HHS-HCC): ICD-10-CM

## 2023-10-13 DIAGNOSIS — I10 HYPERTENSION, BENIGN: ICD-10-CM

## 2023-10-13 DIAGNOSIS — F17.200 CURRENT SMOKER: ICD-10-CM

## 2023-10-13 DIAGNOSIS — F17.220 CHEWING TOBACCO NICOTINE DEPENDENCE WITHOUT COMPLICATION: ICD-10-CM

## 2023-10-13 DIAGNOSIS — E11.65 TYPE 2 DIABETES MELLITUS WITH HYPERGLYCEMIA, WITHOUT LONG-TERM CURRENT USE OF INSULIN (MULTI): ICD-10-CM

## 2023-10-13 DIAGNOSIS — G47.33 OSA ON CPAP: ICD-10-CM

## 2023-10-13 DIAGNOSIS — E78.5 DYSLIPIDEMIA, GOAL LDL BELOW 70: ICD-10-CM

## 2023-10-13 DIAGNOSIS — E78.5 HYPERLIPIDEMIA ASSOCIATED WITH TYPE 2 DIABETES MELLITUS (MULTI): ICD-10-CM

## 2023-10-13 DIAGNOSIS — F32.89 OTHER DEPRESSION: ICD-10-CM

## 2023-10-13 DIAGNOSIS — E11.69 HYPERLIPIDEMIA ASSOCIATED WITH TYPE 2 DIABETES MELLITUS (MULTI): ICD-10-CM

## 2023-10-13 DIAGNOSIS — G25.81 RESTLESS LEGS SYNDROME: ICD-10-CM

## 2023-10-13 LAB
ALBUMIN SERPL BCP-MCNC: 4.6 G/DL (ref 3.4–5)
ALP SERPL-CCNC: 48 U/L (ref 33–120)
ALT SERPL W P-5'-P-CCNC: 43 U/L (ref 10–52)
ANION GAP SERPL CALC-SCNC: 15 MMOL/L (ref 10–20)
AST SERPL W P-5'-P-CCNC: 27 U/L (ref 9–39)
BILIRUB SERPL-MCNC: 0.9 MG/DL (ref 0–1.2)
BUN SERPL-MCNC: 15 MG/DL (ref 6–23)
CALCIUM SERPL-MCNC: 9.1 MG/DL (ref 8.6–10.3)
CHLORIDE SERPL-SCNC: 103 MMOL/L (ref 98–107)
CHOLEST SERPL-MCNC: 134 MG/DL (ref 0–199)
CHOLESTEROL/HDL RATIO: 2.8
CO2 SERPL-SCNC: 23 MMOL/L (ref 21–32)
CREAT SERPL-MCNC: 0.73 MG/DL (ref 0.5–1.3)
CREAT UR-MCNC: 96.8 MG/DL (ref 20–370)
EST. AVERAGE GLUCOSE BLD GHB EST-MCNC: 123 MG/DL
GFR SERPL CREATININE-BSD FRML MDRD: >90 ML/MIN/1.73M*2
GLUCOSE SERPL-MCNC: 94 MG/DL (ref 74–99)
HBA1C MFR BLD: 5.9 %
HCV AB SER QL: NONREACTIVE
HDLC SERPL-MCNC: 48.6 MG/DL
LDLC SERPL CALC-MCNC: 68 MG/DL
MICROALBUMIN UR-MCNC: 67.5 MG/L
MICROALBUMIN/CREAT UR: 69.7 UG/MG CREAT
NON HDL CHOLESTEROL: 85 MG/DL (ref 0–149)
POTASSIUM SERPL-SCNC: 4 MMOL/L (ref 3.5–5.3)
PROT SERPL-MCNC: 6.7 G/DL (ref 6.4–8.2)
SODIUM SERPL-SCNC: 137 MMOL/L (ref 136–145)
TRIGL SERPL-MCNC: 87 MG/DL (ref 0–149)
VLDL: 17 MG/DL (ref 0–40)

## 2023-10-13 PROCEDURE — 86803 HEPATITIS C AB TEST: CPT

## 2023-10-13 PROCEDURE — 80061 LIPID PANEL: CPT

## 2023-10-13 PROCEDURE — 36415 COLL VENOUS BLD VENIPUNCTURE: CPT

## 2023-10-13 PROCEDURE — 82043 UR ALBUMIN QUANTITATIVE: CPT

## 2023-10-13 PROCEDURE — 83036 HEMOGLOBIN GLYCOSYLATED A1C: CPT

## 2023-10-13 PROCEDURE — 80053 COMPREHEN METABOLIC PANEL: CPT

## 2023-10-13 PROCEDURE — 82570 ASSAY OF URINE CREATININE: CPT

## 2023-10-18 ENCOUNTER — TELEMEDICINE (OUTPATIENT)
Dept: PHARMACY | Facility: HOSPITAL | Age: 53
End: 2023-10-18
Payer: COMMERCIAL

## 2023-10-18 DIAGNOSIS — E11.65 TYPE 2 DIABETES MELLITUS WITH HYPERGLYCEMIA, WITHOUT LONG-TERM CURRENT USE OF INSULIN (MULTI): Primary | ICD-10-CM

## 2023-10-18 RX ORDER — DULAGLUTIDE 0.75 MG/.5ML
0.75 INJECTION, SOLUTION SUBCUTANEOUS
Qty: 6 ML | Refills: 0 | Status: SHIPPED | OUTPATIENT
Start: 2023-10-18 | End: 2023-11-09 | Stop reason: DRUGHIGH

## 2023-10-18 ASSESSMENT — ENCOUNTER SYMPTOMS: DIABETIC ASSOCIATED SYMPTOMS: 0

## 2023-10-18 NOTE — ASSESSMENT & PLAN NOTE
Stop Mounjaro 2.5 mg  Start Trulicity 0.75 mg once weekly. Rx sent for 3 month supply to [email or web address].

## 2023-10-18 NOTE — Clinical Note
As of 10/1/24, pt insurance no longer covers Mounjaro and prefers Trulicity in 3 month supplies. We will start pt on Trulicity 0.75 mg weekly and follow up every 2-3 months for titration.

## 2023-10-18 NOTE — PROGRESS NOTES
Pharmacy Clinic Note    Joe Lopez is a 53 y.o. male was referred to Clinical Pharmacy Team for diabetes management.     Referring Provider: Jeremiah Mccabe, DO    Subjective   No Known Allergies    [id number] - [place], [place] - [address] [place] [address] AT CORNER OF [place] [id number]  Phone: [phone] Fax: [phone]      Diabetes  He presents for his follow-up diabetic visit. He has type 2 diabetes mellitus. His disease course has been stable. There are no hypoglycemic associated symptoms. There are no diabetic associated symptoms. There are no hypoglycemic complications. Risk factors for coronary artery disease include diabetes mellitus, male sex, tobacco exposure and obesity. Current diabetic treatments: Metformin 1000 mg BID; Mounjaro 2.5 mg weekly (pt took in 4 days, then had to stop for insurance reasons) He is compliant with treatment all of the time.       Objective     There were no vitals taken for this visit.     LAB  Lab Results   Component Value Date    BILITOT 0.9 10/13/2023    CALCIUM 9.1 10/13/2023    CO2 23 10/13/2023     10/13/2023    CREATININE 0.73 10/13/2023    GLUCOSE 94 10/13/2023    ALKPHOS 48 10/13/2023    K 4.0 10/13/2023    PROT 6.7 10/13/2023     10/13/2023    AST 27 10/13/2023    ALT 43 10/13/2023    BUN 15 10/13/2023    ANIONGAP 15 10/13/2023    MG 2.01 12/31/2019    ALBUMIN 4.6 10/13/2023    GFRMALE >90 03/03/2023     Lab Results   Component Value Date    TRIG 87 10/13/2023    CHOL 134 10/13/2023    LDLCALC 68 10/13/2023    HDL 48.6 10/13/2023     Lab Results   Component Value Date    HGBA1C 5.9 (H) 10/13/2023       Current Outpatient Medications on File Prior to Visit   Medication Sig Dispense Refill    budesonide-formoteroL (Symbicort) 160-4.5 mcg/actuation inhaler Inhale 2 puffs 2 times a day. 180 each 3    cyclobenzaprine (Flexeril) 10 mg tablet TAKE 1 TABLET BY MOUTH AS NEEDED AT BEDTIME FOR MUSCLE SPASMS. 30 tablet 0     escitalopram (Lexapro) 10 mg tablet Take 1 tablet (10 mg) by mouth once daily. 90 tablet 3    fluticasone propion-salmeteroL (Advair Diskus) 250-50 mcg/dose diskus inhaler Inhale 1 puff 2 times a day. 60 each 3    hydroCHLOROthiazide (HYDRODiuril) 50 mg tablet Take 1 tablet (50 mg) by mouth once daily. 30 tablet 5    losartan (Cozaar) 100 mg tablet Take 1 tablet (100 mg) by mouth once daily. 30 tablet 5    metFORMIN (Glucophage) 1,000 mg tablet Take 1 tablet (1,000 mg) by mouth 2 times a day with meals. 180 tablet 3    OneTouch Ultra Test strip 1 strip 2 times a day.      OneTouch UltraSoft Lancets misc 1 each 2 times a day.      pramipexole (Mirapex) 0.125 mg tablet Take 1 tablet (0.125 mg) by mouth once daily at bedtime. 90 tablet 3    rosuvastatin (Crestor) 10 mg tablet Take 1 tablet (10 mg) by mouth once daily at bedtime. 90 tablet 3    [DISCONTINUED] tirzepatide (Mounjaro) 2.5 mg/0.5 mL pen injector INJECT 2.5 MG SUBCUTANEOUSLY ONE TIME PER WEEK 2 mL 1     No current facility-administered medications on file prior to visit.        DRUG INTERACTIONS  - No significant drug-drug interactions exist that require change in therapy  _______________________________________________________________________  PATIENT EDUCATION/GOALS    1. Discussed disease specific goals:   - Fasting B - 130 mg/dL  - Postprandial BG: less than 180 mg/dL  - A1c: less than 7%    2. Since last visit, patient never managed to restart Mounjaro 2.5 mg due to a formulary change preventing Mounjaro being used. Given that the insurance requires a 3 month supply of Trulicity to be used, we will start the patient on Trulicity 0.75 mg once weekly and follow up in 1 month for tolerance, then 3 months for titration.  _______________________________________________________________________    Assessment/Plan   Problem List Items Addressed This Visit       Type 2 diabetes mellitus with hyperglycemia, without long-term current use of insulin (CMS/[facility])  - Primary    Relevant Medications    dulaglutide (Trulicity) 0.75 mg/0.5 mL pen injector    Other Relevant Orders    Follow Up In [facility] - Pharmacy        Continue all meds under the continuation of care with the referring provider and clinical pharmacy team.    Clinical Pharmacist follow-up: 10/9/23    Avinash Fay, PharmD     Verbal consent to manage patient's drug therapy was obtained from [the patient and/or an individual authorized to act on behalf of a patient]. They were informed they may decline to participate or withdraw from participation in pharmacy services at any time.

## 2023-11-09 ENCOUNTER — TELEMEDICINE (OUTPATIENT)
Dept: PHARMACY | Facility: HOSPITAL | Age: 53
End: 2023-11-09
Payer: COMMERCIAL

## 2023-11-09 DIAGNOSIS — E11.65 TYPE 2 DIABETES MELLITUS WITH HYPERGLYCEMIA, WITHOUT LONG-TERM CURRENT USE OF INSULIN (MULTI): ICD-10-CM

## 2023-11-09 RX ORDER — DULAGLUTIDE 1.5 MG/.5ML
1.5 INJECTION, SOLUTION SUBCUTANEOUS
Qty: 6 ML | Refills: 0 | Status: SHIPPED | OUTPATIENT
Start: 2023-11-09 | End: 2023-12-04 | Stop reason: DRUGHIGH

## 2023-11-09 ASSESSMENT — ENCOUNTER SYMPTOMS: DIABETIC ASSOCIATED SYMPTOMS: 0

## 2023-11-09 NOTE — Clinical Note
Trulicity increased to 1.5 mg. Pt to start new dose when insurance allows (3 month supply limitations).

## 2023-11-09 NOTE — PROGRESS NOTES
Pharmacy Clinic Note    Joe Lopez is a 53 y.o. male was referred to Clinical Pharmacy Team for diabetes management.     Referring Provider: Jeremiah Mccabe, DO    Subjective   No Known Allergies    [id number] - [place], [place] - [address] [place] [address] AT [address] OF [place] [id number]  Phone: [phone] Fax: [phone]      Diabetes  He presents for his follow-up diabetic visit. He has type 2 diabetes mellitus. His disease course has been stable. There are no hypoglycemic associated symptoms. There are no diabetic associated symptoms. There are no hypoglycemic complications. Risk factors for coronary artery disease include diabetes mellitus, male sex, tobacco exposure and obesity. Current diabetic treatments: Metformin 1000 mg BID; Trulicity 0.75 mg weekly. He is compliant with treatment all of the time.       Objective     There were no vitals taken for this visit.     LAB  Lab Results   Component Value Date    BILITOT 0.9 10/13/2023    CALCIUM 9.1 10/13/2023    CO2 23 10/13/2023     10/13/2023    CREATININE 0.73 10/13/2023    GLUCOSE 94 10/13/2023    ALKPHOS 48 10/13/2023    K 4.0 10/13/2023    PROT 6.7 10/13/2023     10/13/2023    AST 27 10/13/2023    ALT 43 10/13/2023    BUN 15 10/13/2023    ANIONGAP 15 10/13/2023    MG 2.01 12/31/2019    ALBUMIN 4.6 10/13/2023    GFRMALE >90 03/03/2023     Lab Results   Component Value Date    TRIG 87 10/13/2023    CHOL 134 10/13/2023    LDLCALC 68 10/13/2023    HDL 48.6 10/13/2023     Lab Results   Component Value Date    HGBA1C 5.9 (H) 10/13/2023       Current Outpatient Medications on File Prior to Visit   Medication Sig Dispense Refill   • budesonide-formoteroL (Symbicort) 160-4.5 mcg/actuation inhaler Inhale 2 puffs 2 times a day. 180 each 3   • cyclobenzaprine (Flexeril) 10 mg tablet TAKE 1 TABLET BY MOUTH AS NEEDED AT BEDTIME FOR MUSCLE SPASMS. 30 tablet 0   • escitalopram (Lexapro) 10 mg tablet Take 1 tablet (10 mg)  by mouth once daily. 90 tablet 3   • fluticasone propion-salmeteroL (Advair Diskus) 250-50 mcg/dose diskus inhaler Inhale 1 puff 2 times a day. 60 each 3   • hydroCHLOROthiazide (HYDRODiuril) 50 mg tablet Take 1 tablet (50 mg) by mouth once daily. 30 tablet 5   • losartan (Cozaar) 100 mg tablet Take 1 tablet (100 mg) by mouth once daily. 30 tablet 5   • metFORMIN (Glucophage) 1,000 mg tablet Take 1 tablet (1,000 mg) by mouth 2 times a day with meals. 180 tablet 3   • OneTouch Ultra Test strip 1 strip 2 times a day.     • OneTouch UltraSoft Lancets misc 1 each 2 times a day.     • pramipexole (Mirapex) 0.125 mg tablet Take 1 tablet (0.125 mg) by mouth once daily at bedtime. 90 tablet 3   • rosuvastatin (Crestor) 10 mg tablet Take 1 tablet (10 mg) by mouth once daily at bedtime. 90 tablet 3   • [DISCONTINUED] dulaglutide (Trulicity) 0.75 mg/0.5 mL pen injector Inject 0.75 mg under the skin 1 (one) time per week. 6 mL 0     No current facility-administered medications on file prior to visit.        DRUG INTERACTIONS  - No significant drug-drug interactions exist that require change in therapy  _______________________________________________________________________  PATIENT EDUCATION/GOALS    1. Discussed disease specific goals:   - Fasting B - 130 mg/dL  - Postprandial BG: less than 180 mg/dL  - A1c: less than 7%    2. Since last visit, patient has started Trulicity 0.75 mg and is tolerating it well. We will send a new rx for Trulicity 1.5 mg weekly to his pharmacy and he will begin once the insurance allows him to fill this dose (restricted to a 90 day supply). Patient to contact me once 1.5 mg is started in order to schedule next follow up.  _______________________________________________________________________    Assessment/Plan   Problem List Items Addressed This Visit       Type 2 diabetes mellitus with hyperglycemia, without long-term current use of insulin (CMS/[facility])     Increase Trulicity to 1.5 mg once  weekly. Rx sent for 3 month supply to [facility].         Relevant Medications    dulaglutide (Trulicity) 1.5 mg/0.5 mL pen injector injection        Continue all meds under the continuation of care with the referring provider and clinical pharmacy team.    Clinical Pharmacist follow-up: per pt callback    Avinash Fay, PharmD     Verbal consent to manage patient's drug therapy was obtained from [the patient and/or an individual authorized to act on behalf of a patient]. They were informed they may decline to participate or withdraw from participation in pharmacy services at any time.

## 2023-11-13 ENCOUNTER — APPOINTMENT (OUTPATIENT)
Dept: PHARMACY | Facility: HOSPITAL | Age: 53
End: 2023-11-13
Payer: COMMERCIAL

## 2023-12-04 ENCOUNTER — TELEMEDICINE (OUTPATIENT)
Dept: PHARMACY | Facility: HOSPITAL | Age: 53
End: 2023-12-04
Payer: COMMERCIAL

## 2023-12-04 DIAGNOSIS — E11.65 TYPE 2 DIABETES MELLITUS WITH HYPERGLYCEMIA, WITHOUT LONG-TERM CURRENT USE OF INSULIN (MULTI): Primary | ICD-10-CM

## 2023-12-04 DIAGNOSIS — E11.65 TYPE 2 DIABETES MELLITUS WITH HYPERGLYCEMIA, WITHOUT LONG-TERM CURRENT USE OF INSULIN (MULTI): ICD-10-CM

## 2023-12-04 ASSESSMENT — ENCOUNTER SYMPTOMS: DIABETIC ASSOCIATED SYMPTOMS: 0

## 2023-12-04 NOTE — PROGRESS NOTES
Pharmacy Clinic Note    Joe Lopez is a 53 y.o. male was referred to Clinical Pharmacy Team for diabetes management.     Referring Provider: Jeremiah Mccabe, DO    Subjective   No Known Allergies    [facility] #[id number] - [place], [place] - [address] [place] [address] AT [address] OF [place] [id number]  Phone: [phone] Fax: [phone]      Diabetes  He presents for his follow-up diabetic visit. He has type 2 diabetes mellitus. His disease course has been stable. There are no hypoglycemic associated symptoms. There are no diabetic associated symptoms. There are no hypoglycemic complications. Risk factors for coronary artery disease include diabetes mellitus, male sex, tobacco exposure and obesity. Current diabetic treatments: Metformin 1000 mg BID; Trulicity 1.5 mg weekly. He is compliant with treatment all of the time.       Objective     There were no vitals taken for this visit.     LAB  Lab Results   Component Value Date    BILITOT 0.9 10/13/2023    CALCIUM 9.1 10/13/2023    CO2 23 10/13/2023     10/13/2023    CREATININE 0.73 10/13/2023    GLUCOSE 94 10/13/2023    ALKPHOS 48 10/13/2023    K 4.0 10/13/2023    PROT 6.7 10/13/2023     10/13/2023    AST 27 10/13/2023    ALT 43 10/13/2023    BUN 15 10/13/2023    ANIONGAP 15 10/13/2023    MG 2.01 12/31/2019    ALBUMIN 4.6 10/13/2023    GFRMALE >90 03/03/2023     Lab Results   Component Value Date    TRIG 87 10/13/2023    CHOL 134 10/13/2023    LDLCALC 68 10/13/2023    HDL 48.6 10/13/2023     Lab Results   Component Value Date    HGBA1C 5.9 (H) 10/13/2023       Current Outpatient Medications on File Prior to Visit   Medication Sig Dispense Refill    budesonide-formoteroL (Symbicort) 160-4.5 mcg/actuation inhaler Inhale 2 puffs 2 times a day. 180 each 3    cyclobenzaprine (Flexeril) 10 mg tablet TAKE 1 TABLET BY MOUTH AS NEEDED AT BEDTIME FOR MUSCLE SPASMS. 30 tablet 0    escitalopram (Lexapro) 10 mg tablet Take 1 tablet (10 mg) by  mouth once daily. 90 tablet 3    fluticasone propion-salmeteroL (Advair Diskus) 250-50 mcg/dose diskus inhaler Inhale 1 puff 2 times a day. 60 each 3    hydroCHLOROthiazide (HYDRODiuril) 50 mg tablet Take 1 tablet (50 mg) by mouth once daily. 30 tablet 5    losartan (Cozaar) 100 mg tablet Take 1 tablet (100 mg) by mouth once daily. 30 tablet 5    metFORMIN (Glucophage) 1,000 mg tablet Take 1 tablet (1,000 mg) by mouth 2 times a day with meals. 180 tablet 3    OneTouch Ultra Test strip 1 strip 2 times a day.      OneTouch UltraSoft Lancets misc 1 each 2 times a day.      pramipexole (Mirapex) 0.125 mg tablet Take 1 tablet (0.125 mg) by mouth once daily at bedtime. 90 tablet 3    rosuvastatin (Crestor) 10 mg tablet Take 1 tablet (10 mg) by mouth once daily at bedtime. 90 tablet 3    [DISCONTINUED] dulaglutide (Trulicity) 1.5 mg/0.5 mL pen injector injection Inject 1.5 mg under the skin 1 (one) time per week. 6 mL 0     No current facility-administered medications on file prior to visit.        DRUG INTERACTIONS  - No significant drug-drug interactions exist that require change in therapy  _______________________________________________________________________  PATIENT EDUCATION/GOALS    1. Discussed disease specific goals:   - Fasting B - 130 mg/dL  - Postprandial BG: less than 180 mg/dL  - A1c: less than 7%    2. Patient is currently tolerating Trulicity 1.5 mg weekly and has been receiving messaging regarding the need for refills form his pharmacy. Given current positive tolerance, we will increase his dose to Trulicity 3 mg weekly and send a 3 month supply (d/t insurance restrictions).    3. Patient mentioned that he would like to start the [employer] 2 sensors + reader for monitoring his bG values. These are not covered via his insurance but he is willing to pay cash price at this time.  _______________________________________________________________________    Assessment/Plan   Problem List Items  Addressed This Visit       Type 2 diabetes mellitus with hyperglycemia, without long-term current use of insulin (CMS/[facility])     Increase Trulicity to 3 mg once weekly. Rx sent for 3 month supply to [facility].         Relevant Medications    dulaglutide 3 mg/0.5 mL pen injector        Continue all meds under the continuation of care with the referring provider and clinical pharmacy team.    Clinical Pharmacist follow-up: per pt callback    Avinash Fay, PharmD     Verbal consent to manage patient's drug therapy was obtained from [the patient and/or an individual authorized to act on behalf of a patient]. They were informed they may decline to participate or withdraw from participation in pharmacy services at any time.

## 2023-12-04 NOTE — Clinical Note
Trulicity increased to 3 mg. Follow up in 2-3 months for final titration (pt must use 3 month supplies per insurance)

## 2023-12-06 RX ORDER — FLASH GLUCOSE SCANNING READER
EACH MISCELLANEOUS
Qty: 1 EACH | Refills: 0 | Status: SHIPPED | OUTPATIENT
Start: 2023-12-06

## 2023-12-06 RX ORDER — FLASH GLUCOSE SENSOR
KIT MISCELLANEOUS
Qty: 2 EACH | Refills: 11 | Status: SHIPPED | OUTPATIENT
Start: 2023-12-06

## 2023-12-15 ENCOUNTER — OFFICE VISIT (OUTPATIENT)
Dept: PRIMARY CARE | Facility: CLINIC | Age: 53
End: 2023-12-15
Payer: COMMERCIAL

## 2023-12-15 VITALS
DIASTOLIC BLOOD PRESSURE: 80 MMHG | WEIGHT: 280 LBS | BODY MASS INDEX: 41.47 KG/M2 | HEIGHT: 69 IN | SYSTOLIC BLOOD PRESSURE: 140 MMHG | HEART RATE: 68 BPM

## 2023-12-15 DIAGNOSIS — J45.40 MODERATE PERSISTENT ASTHMA WITHOUT COMPLICATION (HHS-HCC): ICD-10-CM

## 2023-12-15 DIAGNOSIS — R80.9 TYPE 2 DIABETES MELLITUS WITH MICROALBUMINURIA, WITHOUT LONG-TERM CURRENT USE OF INSULIN (MULTI): ICD-10-CM

## 2023-12-15 DIAGNOSIS — F17.200 CURRENT SMOKER: ICD-10-CM

## 2023-12-15 DIAGNOSIS — E11.65 TYPE 2 DIABETES MELLITUS WITH HYPERGLYCEMIA, WITHOUT LONG-TERM CURRENT USE OF INSULIN (MULTI): ICD-10-CM

## 2023-12-15 DIAGNOSIS — E78.5 DYSLIPIDEMIA, GOAL LDL BELOW 70: ICD-10-CM

## 2023-12-15 DIAGNOSIS — E78.5 HYPERLIPIDEMIA ASSOCIATED WITH TYPE 2 DIABETES MELLITUS (MULTI): ICD-10-CM

## 2023-12-15 DIAGNOSIS — G25.81 RESTLESS LEGS SYNDROME: ICD-10-CM

## 2023-12-15 DIAGNOSIS — E11.29 TYPE 2 DIABETES MELLITUS WITH MICROALBUMINURIA, WITHOUT LONG-TERM CURRENT USE OF INSULIN (MULTI): ICD-10-CM

## 2023-12-15 DIAGNOSIS — F32.5 MAJOR DEPRESSIVE DISORDER WITH SINGLE EPISODE, IN FULL REMISSION (CMS-HCC): Primary | ICD-10-CM

## 2023-12-15 DIAGNOSIS — G47.33 OSA ON CPAP: ICD-10-CM

## 2023-12-15 DIAGNOSIS — E66.01 CLASS 3 SEVERE OBESITY DUE TO EXCESS CALORIES WITH SERIOUS COMORBIDITY AND BODY MASS INDEX (BMI) OF 40.0 TO 44.9 IN ADULT (MULTI): ICD-10-CM

## 2023-12-15 DIAGNOSIS — E11.69 HYPERLIPIDEMIA ASSOCIATED WITH TYPE 2 DIABETES MELLITUS (MULTI): ICD-10-CM

## 2023-12-15 DIAGNOSIS — M54.16 LUMBAR RADICULOPATHY, CHRONIC: ICD-10-CM

## 2023-12-15 DIAGNOSIS — I10 HYPERTENSION, BENIGN: ICD-10-CM

## 2023-12-15 DIAGNOSIS — F17.220 CHEWING TOBACCO NICOTINE DEPENDENCE WITHOUT COMPLICATION: ICD-10-CM

## 2023-12-15 DIAGNOSIS — F32.89 OTHER DEPRESSION: ICD-10-CM

## 2023-12-15 PROCEDURE — 3060F POS MICROALBUMINURIA REV: CPT | Performed by: INTERNAL MEDICINE

## 2023-12-15 PROCEDURE — 3077F SYST BP >= 140 MM HG: CPT | Performed by: INTERNAL MEDICINE

## 2023-12-15 PROCEDURE — 99214 OFFICE O/P EST MOD 30 MIN: CPT | Performed by: INTERNAL MEDICINE

## 2023-12-15 PROCEDURE — 3079F DIAST BP 80-89 MM HG: CPT | Performed by: INTERNAL MEDICINE

## 2023-12-15 PROCEDURE — 3044F HG A1C LEVEL LT 7.0%: CPT | Performed by: INTERNAL MEDICINE

## 2023-12-15 PROCEDURE — 4010F ACE/ARB THERAPY RXD/TAKEN: CPT | Performed by: INTERNAL MEDICINE

## 2023-12-15 PROCEDURE — 3048F LDL-C <100 MG/DL: CPT | Performed by: INTERNAL MEDICINE

## 2023-12-15 PROCEDURE — 3008F BODY MASS INDEX DOCD: CPT | Performed by: INTERNAL MEDICINE

## 2023-12-15 RX ORDER — DULAGLUTIDE 4.5 MG/.5ML
4.5 INJECTION, SOLUTION SUBCUTANEOUS
Qty: 2 ML | Refills: 11 | Status: SHIPPED | OUTPATIENT
Start: 2023-12-15 | End: 2024-01-12 | Stop reason: SDUPTHER

## 2023-12-15 RX ORDER — DAPAGLIFLOZIN 10 MG/1
10 TABLET, FILM COATED ORAL DAILY
Qty: 90 TABLET | Refills: 3 | Status: SHIPPED | OUTPATIENT
Start: 2023-12-15 | End: 2024-12-14

## 2023-12-15 NOTE — PROGRESS NOTES
"Subjective   Reason for Visit: Joe Lopez is an 54 y.o. male here for a fu visit.     Past Medical, Surgical, and Family History reviewed and updated in chart.    Reviewed all medications by prescribing practitioner or clinical pharmacist (such as prescriptions, OTCs, herbal therapies and supplements) and documented in the medical record.    HPI    Patient Care Team:  Jeremiah Mccabe DO as PCP - General  Je[facility]     Review of Systems   All other systems reviewed and are negative.      Objective   Vitals:  /80 (BP Location: Right arm, Patient Position: Sitting)   Pulse 68   Ht 1.753 m (5' 9\")   Wt 127 kg (280 lb)   BMI 41.35 kg/m²       Physical Exam  Vitals and nursing note reviewed.   Constitutional:       General: He is not in acute distress.     Appearance: Normal appearance. He is well-developed. He is obese. He is not toxic-appearing.   HENT:      Head: Normocephalic and atraumatic.      Right Ear: Tympanic membrane and external ear normal.      Left Ear: Tympanic membrane and external ear normal.      Nose: Nose normal.      Mouth/Throat:      Mouth: Mucous membranes are moist.      Pharynx: Oropharynx is clear. No oropharyngeal exudate or posterior oropharyngeal erythema.      Tonsils: No tonsillar exudate. 2+ on the right. 2+ on the left.   Eyes:      Extraocular Movements: Extraocular movements intact.      Conjunctiva/sclera: Conjunctivae normal.   Cardiovascular:      Rate and Rhythm: Normal rate and regular rhythm.      Pulses: Normal pulses.      Heart sounds: Normal heart sounds. No murmur heard.  Pulmonary:      Effort: Pulmonary effort is normal.      Breath sounds: Normal breath sounds.   Abdominal:      General: Abdomen is flat. Bowel sounds are normal.      Palpations: Abdomen is soft.   Musculoskeletal:      Cervical back: Neck supple.   Feet:      Right foot:      Skin integrity: Skin integrity normal. No ulcer, blister, skin breakdown, " erythema, warmth or callus.      Toenail Condition: Right toenails are normal.      Left foot:      Skin integrity: Skin integrity normal. No ulcer, blister, skin breakdown, erythema, warmth or callus.      Toenail Condition: Left toenails are normal.   Lymphadenopathy:      Cervical: No cervical adenopathy.   Skin:     General: Skin is warm and dry.      Capillary Refill: Capillary refill takes more than 3 seconds.      Findings: No rash.   Neurological:      Mental Status: He is alert. Mental status is at baseline.      Sensory: Sensation is intact.   Psychiatric:         Mood and Affect: Mood normal.         Behavior: Behavior normal.         Thought Content: Thought content normal.         Judgment: Judgment normal.     Lifestyle Recommendations  I recommend a whole-food plant-based diet, an eating pattern that encourages the consumption of unrefined plant foods (such as fruits, vegetables, tubers, whole grains, legumes, nuts and seeds) and discourages meats, dairy products, eggs and processed foods.     The AHA/ACC recommends that the patient consume a dietary pattern that emphasizes intake of vegetables, fruits, and whole grains; includes low-fat dairy products, poultry, fish, legumes, non-tropical vegetable oils, and nuts; and limits intake of sodium, sweets, sugar-sweetened beverages, and red meats.  Adapt this dietary pattern to appropriate calorie requirements (a 500-750 kcal/day deficit to loose weight), personal and cultural food preferences, and nutrition therapy for other medical conditions (including diabetes).  Achieve this pattern by following plans such as the [place] Mediterranean, DASH dietary pattern, or AHA diet.     Engage in 2 hours and 30 minutes per week of moderate-intensity physical activity, or 1 hour and 15 minutes (75 minutes) per week of vigorous-intensity aerobic physical activity, or an equivalent combination of moderate and vigorous-intensity aerobic physical activity. Aerobic  activity should be performed in episodes of at least 10 minutes preferably spread throughout the week.     Adhering to a heart healthy diet, regular exercise habits, avoidance of tobacco products, and maintenance of a healthy weight are crucial components of their heart disease risk reduction.    Assessment/Plan   Problem List Items Addressed This Visit       Hyperlipidemia associated with type 2 diabetes mellitus (Multi)    Relevant Orders    Comprehensive Metabolic Panel    Hemoglobin A1C    Lipid Panel    Albumin , Urine Random    Follow Up In [facility] - [facility] - Health Maintenance    Hypertension, benign    Relevant Orders    Comprehensive Metabolic Panel    Hemoglobin A1C    Lipid Panel    Albumin , Urine Random    Follow Up In [facility] - [facility] - Health Maintenance    Moderate persistent asthma without complication ([facility])    Relevant Orders    Comprehensive Metabolic Panel    Hemoglobin A1C    Lipid Panel    Albumin , Urine Random    Follow Up In [facility] - [facility] - Health Maintenance    PAPO on CPAP    Relevant Orders    Comprehensive Metabolic Panel    Hemoglobin A1C    Lipid Panel    Albumin , Urine Random    Follow Up In Advanced Primary [facility] Health Maintenance    Restless legs syndrome    Type 2 diabetes mellitus with microalbuminuria, without long-term current use of insulin (Multi)    Relevant Medications    dapagliflozin propanediol (Farxiga) 10 mg    Other Relevant Orders    Comprehensive Metabolic Panel    Hemoglobin A1C    Lipid Panel    Albumin , Urine Random    Follow Up In [facility] - [facility] - Health Maintenance    RESOLVED: Class 3 severe obesity due to excess calories with serious comorbidity and body mass index (BMI) of 40.0 to 44.9 in adult (Multi)    Relevant Orders    Comprehensive Metabolic Panel    Hemoglobin A1C    Lipid Panel    Albumin , Urine Random    Follow Up In [facility] - [facility] - Health Maintenance    Chewing tobacco  nicotine dependence without complication    Relevant Orders    Comprehensive Metabolic Panel    Hemoglobin A1C    Lipid Panel    Albumin , Urine Random    Follow Up In [facility] - PCP - Health Maintenance    Lumbar radiculopathy, chronic    Major depressive disorder with single episode, in full remission (CMS-[facility]) - Primary     Other Visit Diagnoses       Dyslipidemia, goal LDL below 70        Relevant Orders    Comprehensive Metabolic Panel    Hemoglobin A1C    Lipid Panel    Albumin , Urine Random    Follow Up In [facility] - PCP - Health Maintenance    Other depression        Current smoker

## 2023-12-15 NOTE — PROGRESS NOTES
"Subjective   Patient ID: Joe Lopez is a 53 y.o. male who presents for Follow-up.    HPI     Review of Systems    Objective   /80 (BP Location: Right arm, Patient Position: Sitting)   Pulse 68   Ht 1.753 m (5' 9\")   Wt 127 kg (280 lb)   BMI 41.35 kg/m²     Physical Exam    Assessment/Plan          "

## 2024-01-12 ENCOUNTER — TELEPHONE (OUTPATIENT)
Dept: PHARMACY | Facility: HOSPITAL | Age: 54
End: 2024-01-12
Payer: COMMERCIAL

## 2024-01-12 DIAGNOSIS — E11.65 TYPE 2 DIABETES MELLITUS WITH HYPERGLYCEMIA, WITHOUT LONG-TERM CURRENT USE OF INSULIN (MULTI): ICD-10-CM

## 2024-01-12 RX ORDER — DULAGLUTIDE 4.5 MG/.5ML
4.5 INJECTION, SOLUTION SUBCUTANEOUS
Qty: 6 ML | Refills: 3 | Status: SHIPPED | OUTPATIENT
Start: 2024-01-12 | End: 2024-01-30 | Stop reason: DRUGHIGH

## 2024-01-12 NOTE — TELEPHONE ENCOUNTER
Patient called to inform pharmacy team that he is tolerating Trulicity 4.5 mg well, but needs a 90 day supply per his insurance. We will send an updated rx for 90 days.

## 2024-01-30 ENCOUNTER — TELEMEDICINE (OUTPATIENT)
Dept: PHARMACY | Facility: HOSPITAL | Age: 54
End: 2024-01-30
Payer: COMMERCIAL

## 2024-01-30 DIAGNOSIS — E11.65 TYPE 2 DIABETES MELLITUS WITH HYPERGLYCEMIA, WITHOUT LONG-TERM CURRENT USE OF INSULIN (MULTI): Primary | ICD-10-CM

## 2024-01-30 DIAGNOSIS — E11.65 TYPE 2 DIABETES MELLITUS WITH HYPERGLYCEMIA, WITHOUT LONG-TERM CURRENT USE OF INSULIN (MULTI): ICD-10-CM

## 2024-01-30 PROCEDURE — RXMED WILLOW AMBULATORY MEDICATION CHARGE

## 2024-01-30 ASSESSMENT — ENCOUNTER SYMPTOMS: DIABETIC ASSOCIATED SYMPTOMS: 0

## 2024-01-30 NOTE — PROGRESS NOTES
Pharmacy Clinic Note    Joe Lopez is a 53 y.o. male was referred to Clinical Pharmacy Team for diabetes management.     Referring Provider: Jeremiah Mccabe, DO    Subjective   No Known Allergies    [id number] - [place], [place] - [address] [place] [address] AT CORNER OF [address] [address]  Phone: [phone] Fax: [phone]    [facility]  [address], [address]  [place] [address]  Phone: [phone] Fax: [phone]      Diabetes  He presents for his follow-up diabetic visit. He has type 2 diabetes mellitus. His disease course has been stable. There are no hypoglycemic associated symptoms. There are no diabetic associated symptoms. There are no hypoglycemic complications. Risk factors for coronary artery disease include diabetes mellitus, male sex, tobacco exposure and obesity. Current diabetic treatments: Metformin 1000 mg BID; Trulicity 4.5 mg weekly. He is compliant with treatment all of the time.       Objective     There were no vitals taken for this visit.     LAB  Lab Results   Component Value Date    BILITOT 0.9 10/13/2023    CALCIUM 9.1 10/13/2023    CO2 23 10/13/2023     10/13/2023    CREATININE 0.73 10/13/2023    GLUCOSE 94 10/13/2023    ALKPHOS 48 10/13/2023    K 4.0 10/13/2023    PROT 6.7 10/13/2023     10/13/2023    AST 27 10/13/2023    ALT 43 10/13/2023    BUN 15 10/13/2023    ANIONGAP 15 10/13/2023    MG 2.01 12/31/2019    ALBUMIN 4.6 10/13/2023    GFRMALE >90 03/03/2023     Lab Results   Component Value Date    TRIG 87 10/13/2023    CHOL 134 10/13/2023    LDLCALC 68 10/13/2023    HDL 48.6 10/13/2023     Lab Results   Component Value Date    HGBA1C 5.9 (H) 10/13/2023       Current Outpatient Medications on File Prior to Visit   Medication Sig Dispense Refill    amLODIPine-benazepriL (Lotrel) 10-20 mg capsule Take 1 capsule by mouth once daily. 90 capsule 3    budesonide-formoteroL (Symbicort) 160-4.5 mcg/actuation inhaler  Inhale 2 puffs 2 times a day. 180 each 3    cyclobenzaprine (Flexeril) 10 mg tablet TAKE 1 TABLET BY MOUTH AS NEEDED AT BEDTIME FOR MUSCLE SPASMS. 30 tablet 0    dapagliflozin propanediol (Farxiga) 10 mg Take 1 tablet (10 mg) by mouth once daily. 90 tablet 3    FreeStyle Ava reader (FreeStyle Ava 2 [place]) misc Use as instructed 1 each 0    FreeStyle Ava sensor system (FreeStyle Aav 2 Sensor) kit Use as instructed to test sugars daily 2 each 11    hydroCHLOROthiazide (HYDRODiuril) 50 mg tablet Take 1 tablet (50 mg) by mouth once daily. 30 tablet 5    losartan (Cozaar) 100 mg tablet Take 1 tablet (100 mg) by mouth once daily. 30 tablet 5    metFORMIN (Glucophage) 1,000 mg tablet Take 1 tablet (1,000 mg) by mouth 2 times a day with meals. 180 tablet 3    OneTouch Ultra Test strip 1 strip 2 times a day.      OneTouch UltraSoft Lancets misc 1 each 2 times a day.      pramipexole (Mirapex) 0.125 mg tablet Take 1 tablet (0.125 mg) by mouth once daily at bedtime. 90 tablet 3    rosuvastatin (Crestor) 10 mg tablet Take 1 tablet (10 mg) by mouth once daily at bedtime. 90 tablet 3    [DISCONTINUED] dulaglutide (Trulicity) 4.5 mg/0.5 mL pen injector Inject 4.5 mg under the skin 1 (one) time per week. 6 mL 3     No current facility-administered medications on file prior to visit.        DRUG INTERACTIONS  - No significant drug-drug interactions exist that require change in therapy  _______________________________________________________________________  PATIENT EDUCATION/GOALS    1. Discussed disease specific goals:   - Fasting B - 130 mg/dL  - Postprandial BG: less than 180 mg/dL  - A1c: less than 7%    2. Patient is currently unable to tolerate Trulicity at 4.5 mg d/t bloating. We will reduce dose to 3 mg and send to [facility] order ([facility]  _______________________________________________________________________    Assessment/Plan   Problem List Items Addressed This Visit     None  Visit Diagnoses       Type 2 diabetes mellitus with hyperglycemia, without long-term current use of insulin (CMS/[facility])        Relevant Medications    dulaglutide 3 mg/0.5 mL pen injector             Continue all meds under the continuation of care with the referring provider and clinical pharmacy team.    Clinical Pharmacist follow-up: per pt callback    Avinash Fay, PharmD     Verbal consent to manage patient's drug therapy was obtained from [the patient and/or an individual authorized to act on behalf of a patient]. They were informed they may decline to participate or withdraw from participation in pharmacy services at any time.

## 2024-01-31 ENCOUNTER — PHARMACY VISIT (OUTPATIENT)
Dept: PHARMACY | Facility: CLINIC | Age: 54
End: 2024-01-31
Payer: COMMERCIAL

## 2024-02-23 DIAGNOSIS — E78.5 HYPERLIPIDEMIA ASSOCIATED WITH TYPE 2 DIABETES MELLITUS (MULTI): ICD-10-CM

## 2024-02-23 DIAGNOSIS — J45.40 MODERATE PERSISTENT ASTHMA WITHOUT COMPLICATION (HHS-HCC): ICD-10-CM

## 2024-02-23 DIAGNOSIS — E11.69 HYPERLIPIDEMIA ASSOCIATED WITH TYPE 2 DIABETES MELLITUS (MULTI): ICD-10-CM

## 2024-02-23 DIAGNOSIS — E11.65 TYPE 2 DIABETES MELLITUS WITH HYPERGLYCEMIA, WITHOUT LONG-TERM CURRENT USE OF INSULIN (MULTI): ICD-10-CM

## 2024-02-23 DIAGNOSIS — F32.89 OTHER DEPRESSION: ICD-10-CM

## 2024-02-23 DIAGNOSIS — E78.5 DYSLIPIDEMIA, GOAL LDL BELOW 70: ICD-10-CM

## 2024-02-23 DIAGNOSIS — F17.200 CURRENT SMOKER: ICD-10-CM

## 2024-02-23 DIAGNOSIS — G47.33 OSA ON CPAP: ICD-10-CM

## 2024-02-23 DIAGNOSIS — F17.220 CHEWING TOBACCO NICOTINE DEPENDENCE WITHOUT COMPLICATION: ICD-10-CM

## 2024-02-23 DIAGNOSIS — I10 HYPERTENSION, BENIGN: ICD-10-CM

## 2024-02-23 DIAGNOSIS — M54.16 LUMBAR RADICULOPATHY, CHRONIC: ICD-10-CM

## 2024-02-23 DIAGNOSIS — G25.81 RESTLESS LEGS SYNDROME: ICD-10-CM

## 2024-02-23 DIAGNOSIS — E66.01 CLASS 3 SEVERE OBESITY DUE TO EXCESS CALORIES WITH SERIOUS COMORBIDITY AND BODY MASS INDEX (BMI) OF 40.0 TO 44.9 IN ADULT (MULTI): ICD-10-CM

## 2024-02-23 RX ORDER — CYCLOBENZAPRINE HCL 10 MG
10 TABLET ORAL NIGHTLY PRN
Qty: 30 TABLET | Refills: 0 | Status: SHIPPED | OUTPATIENT
Start: 2024-02-23

## 2024-02-23 RX ORDER — LOSARTAN POTASSIUM 100 MG/1
100 TABLET ORAL DAILY
Qty: 90 TABLET | Refills: 1 | Status: SHIPPED | OUTPATIENT
Start: 2024-02-23

## 2024-03-18 ENCOUNTER — TELEMEDICINE (OUTPATIENT)
Dept: PHARMACY | Facility: HOSPITAL | Age: 54
End: 2024-03-18
Payer: COMMERCIAL

## 2024-03-18 DIAGNOSIS — E11.65 TYPE 2 DIABETES MELLITUS WITH HYPERGLYCEMIA, WITHOUT LONG-TERM CURRENT USE OF INSULIN (MULTI): ICD-10-CM

## 2024-03-18 DIAGNOSIS — R80.9 TYPE 2 DIABETES MELLITUS WITH MICROALBUMINURIA, WITHOUT LONG-TERM CURRENT USE OF INSULIN (MULTI): Primary | ICD-10-CM

## 2024-03-18 DIAGNOSIS — E11.65 TYPE 2 DIABETES MELLITUS WITH HYPERGLYCEMIA, WITHOUT LONG-TERM CURRENT USE OF INSULIN (MULTI): Primary | ICD-10-CM

## 2024-03-18 DIAGNOSIS — E11.29 TYPE 2 DIABETES MELLITUS WITH MICROALBUMINURIA, WITHOUT LONG-TERM CURRENT USE OF INSULIN (MULTI): Primary | ICD-10-CM

## 2024-03-18 PROCEDURE — RXMED WILLOW AMBULATORY MEDICATION CHARGE

## 2024-03-18 ASSESSMENT — ENCOUNTER SYMPTOMS: DIABETIC ASSOCIATED SYMPTOMS: 0

## 2024-03-18 NOTE — PROGRESS NOTES
Pharmacy Clinic Note    Joe Lopez is a 54 y.o. male was referred to Clinical Pharmacy Team for diabetes management.     Referring Provider: Jeremiah Mccabe, DO    Subjective   No Known Allergies    [id number] [place], [place] - [address] [facility] AT CORNER OF [place]  [address] [facility] [address]  Phone: [phone] Fax: [phone]    [facility]  [address], [address]  [facility] [address]  Phone: [phone] Fax: [phone]      Diabetes  He presents for his follow-up diabetic visit. He has type 2 diabetes mellitus. His disease course has been stable. There are no hypoglycemic associated symptoms. There are no diabetic associated symptoms. There are no hypoglycemic complications. Risk factors for coronary artery disease include diabetes mellitus, male sex, tobacco exposure and obesity. Current diabetic treatments: Metformin 1000 mg BID; Metformin 1000 mg BID; Trulicity 3 mg weekly (pt reports he stopped taking weeks prior d/t GI effects) He is compliant with treatment most of the time.       Objective     There were no vitals taken for this visit.     LAB  Lab Results   Component Value Date    BILITOT 0.9 10/13/2023    CALCIUM 9.1 10/13/2023    CO2 23 10/13/2023     10/13/2023    CREATININE 0.73 10/13/2023    GLUCOSE 94 10/13/2023    ALKPHOS 48 10/13/2023    K 4.0 10/13/2023    PROT 6.7 10/13/2023     10/13/2023    AST 27 10/13/2023    ALT 43 10/13/2023    BUN 15 10/13/2023    ANIONGAP 15 10/13/2023    MG 2.01 12/31/2019    ALBUMIN 4.6 10/13/2023    GFRMALE >90 03/03/2023     Lab Results   Component Value Date    TRIG 87 10/13/2023    CHOL 134 10/13/2023    LDLCALC 68 10/13/2023    HDL 48.6 10/13/2023     Lab Results   Component Value Date    HGBA1C 5.9 (H) 10/13/2023       Current Outpatient Medications on File Prior to Visit   Medication Sig Dispense Refill    amLODIPine-benazepriL (Lotrel) 10-20 mg capsule Take 1 capsule by mouth once daily. 90  capsule 3    budesonide-formoteroL (Symbicort) 160-4.5 mcg/actuation inhaler Inhale 2 puffs 2 times a day. 180 each 3    cyclobenzaprine (Flexeril) 10 mg tablet TAKE 1 TABLET BY MOUTH AS NEEDED AT BEDTIME FOR MUSCLE SPASMS. 30 tablet 0    dapagliflozin propanediol (Farxiga) 10 mg Take 1 tablet (10 mg) by mouth once daily. 90 tablet 3    FreeStyle Ava reader (FreeStyle Ava 2 [place]) misc Use as instructed 1 each 0    FreeStyle Ava sensor system (FreeStyle Ava 2 Sensor) kit Use as instructed to test sugars daily 2 each 11    hydroCHLOROthiazide (HYDRODiuril) 50 mg tablet Take 1 tablet (50 mg) by mouth once daily. 30 tablet 5    losartan (Cozaar) 100 mg tablet TAKE 1 TABLET BY MOUTH EVERY DAY 90 tablet 1    metFORMIN (Glucophage) 1,000 mg tablet Take 1 tablet (1,000 mg) by mouth 2 times a day with meals. 180 tablet 3    OneTouch Ultra Test strip 1 strip 2 times a day.      OneTouch UltraSoft Lancets misc 1 each 2 times a day.      pramipexole (Mirapex) 0.125 mg tablet Take 1 tablet (0.125 mg) by mouth once daily at bedtime. 90 tablet 3    rosuvastatin (Crestor) 10 mg tablet Take 1 tablet (10 mg) by mouth once daily at bedtime. 90 tablet 3    [DISCONTINUED] dulaglutide 3 mg/0.5 mL pen injector Inject 3 mg under the skin 1 (one) time per week. 2 mL 11     No current facility-administered medications on file prior to visit.      HISTORICAL THERAPIES:  - Trulicity: tolerance    DRUG INTERACTIONS  - No significant drug-drug interactions exist that require change in therapy  _______________________________________________________________________  PATIENT EDUCATION/GOALS    1. Discussed disease specific goals:   - Fasting B - 130 mg/dL  - Postprandial BG: less than 180 mg/dL  - A1c: less than 7%    2. Patient is currently unable to tolerate Trulicity at 3 mg d/t bloating. We will discontinue Trulicity and switch to Ozempic starting at 0.5 mg once weekly and send to [facility] pharmacy for mail order. Plan to  follow up in 3-4 weeks for tolerance    3. Continue Farxiga 10 mg once daily.  _______________________________________________________________________    Assessment/Plan   Problem List Items Addressed This Visit       Type 2 diabetes mellitus with microalbuminuria, without long-term current use of insulin (CMS/[facility]) - Primary     Stop Trulicity 3 mg.  Start Ozempic 0.5 mg once weekly.  Continue Farxiga 10 mg once daily.         Relevant Medications    semaglutide 0.25 mg or 0.5 mg (2 mg/3 mL) pen injector     Other Visit Diagnoses       Type 2 diabetes mellitus with hyperglycemia, without long-term current use of insulin (CMS/[facility])        Relevant Medications    semaglutide 0.25 mg or 0.5 mg (2 mg/3 mL) pen injector    Other Relevant Orders    Follow Up In Advanced Primary Care - Pharmacy             Continue all meds under the continuation of care with the referring provider and clinical pharmacy team.    Clinical Pharmacist follow-up: 3 weeks    Avinash Fay, PharmD     Verbal consent to manage patient's drug therapy was obtained from [the patient and/or an individual authorized to act on behalf of a patient]. They were informed they may decline to participate or withdraw from participation in pharmacy services at any time.

## 2024-03-19 ENCOUNTER — PHARMACY VISIT (OUTPATIENT)
Dept: PHARMACY | Facility: CLINIC | Age: 54
End: 2024-03-19
Payer: COMMERCIAL

## 2024-04-08 ENCOUNTER — TELEMEDICINE (OUTPATIENT)
Dept: PHARMACY | Facility: HOSPITAL | Age: 54
End: 2024-04-08
Payer: COMMERCIAL

## 2024-04-08 ENCOUNTER — APPOINTMENT (OUTPATIENT)
Dept: PHARMACY | Facility: HOSPITAL | Age: 54
End: 2024-04-08
Payer: COMMERCIAL

## 2024-04-08 DIAGNOSIS — E11.65 TYPE 2 DIABETES MELLITUS WITH HYPERGLYCEMIA, WITHOUT LONG-TERM CURRENT USE OF INSULIN (MULTI): ICD-10-CM

## 2024-04-08 DIAGNOSIS — R80.9 TYPE 2 DIABETES MELLITUS WITH MICROALBUMINURIA, WITHOUT LONG-TERM CURRENT USE OF INSULIN (MULTI): Primary | ICD-10-CM

## 2024-04-08 DIAGNOSIS — E11.29 TYPE 2 DIABETES MELLITUS WITH MICROALBUMINURIA, WITHOUT LONG-TERM CURRENT USE OF INSULIN (MULTI): Primary | ICD-10-CM

## 2024-04-08 ASSESSMENT — ENCOUNTER SYMPTOMS: DIABETIC ASSOCIATED SYMPTOMS: 0

## 2024-04-08 NOTE — ASSESSMENT & PLAN NOTE
Continue Ozempic 0.5 mg once weekly.  Continue Farxiga 10 mg once daily.  Continue Metformin 1000 mg BID.

## 2024-04-08 NOTE — PROGRESS NOTES
Pharmacy Clinic Note    Joe Lopez is a 54 y.o. male was referred to Clinical Pharmacy Team for diabetes management.     Referring Provider: Jeremiah Mccabe, DO    Subjective   No Known Allergies    [id number] - [place], [place] - [address] [place] [address] AT CORNER OF [address] [address]  Phone: [phone] Fax: [phone]    [facility]  [address], [address]  [facility] [address]  Phone: [phone] Fax: [phone]      Diabetes  He presents for his follow-up diabetic visit. He has type 2 diabetes mellitus. His disease course has been stable. There are no hypoglycemic associated symptoms. There are no diabetic associated symptoms. There are no hypoglycemic complications. Risk factors for coronary artery disease include diabetes mellitus, male sex, tobacco exposure and obesity. Current diabetic treatments: Metformin 1000 mg BID; Metformin 1000 mg BID; Ozempic 0.5 mg once weekly. He is compliant with treatment most of the time.       Objective     There were no vitals taken for this visit.     LAB  Lab Results   Component Value Date    BILITOT 0.9 10/13/2023    CALCIUM 9.1 10/13/2023    CO2 23 10/13/2023     10/13/2023    CREATININE 0.73 10/13/2023    GLUCOSE 94 10/13/2023    ALKPHOS 48 10/13/2023    K 4.0 10/13/2023    PROT 6.7 10/13/2023     10/13/2023    AST 27 10/13/2023    ALT 43 10/13/2023    BUN 15 10/13/2023    ANIONGAP 15 10/13/2023    MG 2.01 12/31/2019    ALBUMIN 4.6 10/13/2023    GFRMALE >90 03/03/2023     Lab Results   Component Value Date    TRIG 87 10/13/2023    CHOL 134 10/13/2023    LDLCALC 68 10/13/2023    HDL 48.6 10/13/2023     Lab Results   Component Value Date    HGBA1C 5.9 (H) 10/13/2023       Current Outpatient Medications on File Prior to Visit   Medication Sig Dispense Refill    amLODIPine-benazepriL (Lotrel) 10-20 mg capsule Take 1 capsule by mouth once daily. 90 capsule 3    budesonide-formoteroL (Symbicort)  160-4.5 mcg/actuation inhaler Inhale 2 puffs 2 times a day. 180 each 3    cyclobenzaprine (Flexeril) 10 mg tablet TAKE 1 TABLET BY MOUTH AS NEEDED AT BEDTIME FOR MUSCLE SPASMS. 30 tablet 0    dapagliflozin propanediol (Farxiga) 10 mg Take 1 tablet (10 mg) by mouth once daily. 90 tablet 3    FreeStyle Ava reader (FreeStyle Ava 2 [place]) misc Use as instructed 1 each 0    FreeStyle Ava sensor system (FreeStyle Ava 2 Sensor) kit Use as instructed to test sugars daily 2 each 11    hydroCHLOROthiazide (HYDRODiuril) 50 mg tablet Take 1 tablet (50 mg) by mouth once daily. 30 tablet 5    losartan (Cozaar) 100 mg tablet TAKE 1 TABLET BY MOUTH EVERY DAY 90 tablet 1    metFORMIN (Glucophage) 1,000 mg tablet Take 1 tablet (1,000 mg) by mouth 2 times a day with meals. 180 tablet 3    OneTouch Ultra Test strip 1 strip 2 times a day.      OneTouch UltraSoft Lancets misc 1 each 2 times a day.      pramipexole (Mirapex) 0.125 mg tablet Take 1 tablet (0.125 mg) by mouth once daily at bedtime. 90 tablet 3    rosuvastatin (Crestor) 10 mg tablet Take 1 tablet (10 mg) by mouth once daily at bedtime. 90 tablet 3    semaglutide 0.25 mg or 0.5 mg (2 mg/3 mL) pen injector Inject 0.5 mg under the skin 1 (one) time per week.  Replacing Trulicity. 3 mL 3     No current facility-administered medications on file prior to visit.      HISTORICAL THERAPIES:  - Trulicity: tolerance    DRUG INTERACTIONS  - No significant drug-drug interactions exist that require change in therapy  _______________________________________________________________________  PATIENT EDUCATION/GOALS    1. Discussed disease specific goals:   - Fasting B - 130 mg/dL  - Postprandial BG: less than 180 mg/dL  - A1c: less than 7%    2. Patient is currently tolerating Ozempic 0.5 mg once weekly with better results than Trulicity. He still notes some constipation and GI upset so we will continue this dose for 1 more month before re-assessing titration to 1  mg.  _______________________________________________________________________    Assessment/Plan   Problem List Items Addressed This Visit       Type 2 diabetes mellitus with microalbuminuria, without long-term current use of insulin (CMS/[facility]) - Primary     Continue Ozempic 0.5 mg once weekly.  Continue Farxiga 10 mg once daily.  Continue Metformin 1000 mg BID.          Other Visit Diagnoses       Type 2 diabetes mellitus with hyperglycemia, without long-term current use of insulin (CMS/[facility])                 Continue all meds under the continuation of care with the referring provider and clinical pharmacy team.    Clinical Pharmacist follow-up: 3 weeks    Avinash Fay, PharmD     Verbal consent to manage patient's drug therapy was obtained from [the patient and/or an individual authorized to act on behalf of a patient]. They were informed they may decline to participate or withdraw from participation in pharmacy services at any time.

## 2024-04-11 DIAGNOSIS — I10 HYPERTENSION, BENIGN: ICD-10-CM

## 2024-04-11 DIAGNOSIS — E11.65 TYPE 2 DIABETES MELLITUS WITH HYPERGLYCEMIA, WITHOUT LONG-TERM CURRENT USE OF INSULIN (MULTI): ICD-10-CM

## 2024-04-11 RX ORDER — HYDROCHLOROTHIAZIDE 50 MG/1
50 TABLET ORAL DAILY
Qty: 90 TABLET | Refills: 3 | Status: SHIPPED | OUTPATIENT
Start: 2024-04-11

## 2024-05-03 ENCOUNTER — TELEMEDICINE (OUTPATIENT)
Dept: PHARMACY | Facility: HOSPITAL | Age: 54
End: 2024-05-03
Payer: COMMERCIAL

## 2024-05-03 DIAGNOSIS — R80.9 TYPE 2 DIABETES MELLITUS WITH MICROALBUMINURIA, WITHOUT LONG-TERM CURRENT USE OF INSULIN (MULTI): ICD-10-CM

## 2024-05-03 DIAGNOSIS — E11.29 TYPE 2 DIABETES MELLITUS WITH MICROALBUMINURIA, WITHOUT LONG-TERM CURRENT USE OF INSULIN (MULTI): ICD-10-CM

## 2024-05-03 ASSESSMENT — ENCOUNTER SYMPTOMS: DIABETIC ASSOCIATED SYMPTOMS: 0

## 2024-05-03 NOTE — PROGRESS NOTES
Pharmacy Clinic Note    Joe Lopez is a 54 y.o. male was referred to Clinical Pharmacy Team for diabetes management.     Referring Provider: Jeremiah Mccabe, DO    Subjective   No Known Allergies    [id number] [place], [place] - [address] [place] [address] AT CORNER OF [address] [address]  Phone: [phone] Fax: [phone]    [facility]  [address], [address]  [facility] [address]  Phone: [phone] Fax: [phone]      Diabetes  He presents for his follow-up diabetic visit. He has type 2 diabetes mellitus. His disease course has been stable. There are no hypoglycemic associated symptoms. There are no diabetic associated symptoms. There are no hypoglycemic complications. Risk factors for coronary artery disease include diabetes mellitus, male sex, tobacco exposure and obesity. Current diabetic treatments: Metformin 1000 mg BID; Ozempic 0.5 mg once weekly. He is compliant with treatment most of the time.       Objective     There were no vitals taken for this visit.     LAB  Lab Results   Component Value Date    BILITOT 0.9 10/13/2023    CALCIUM 9.1 10/13/2023    CO2 23 10/13/2023     10/13/2023    CREATININE 0.73 10/13/2023    GLUCOSE 94 10/13/2023    ALKPHOS 48 10/13/2023    K 4.0 10/13/2023    PROT 6.7 10/13/2023     10/13/2023    AST 27 10/13/2023    ALT 43 10/13/2023    BUN 15 10/13/2023    ANIONGAP 15 10/13/2023    MG 2.01 12/31/2019    ALBUMIN 4.6 10/13/2023    GFRMALE >90 03/03/2023     Lab Results   Component Value Date    TRIG 87 10/13/2023    CHOL 134 10/13/2023    LDLCALC 68 10/13/2023    HDL 48.6 10/13/2023     Lab Results   Component Value Date    HGBA1C 5.9 (H) 10/13/2023       Current Outpatient Medications on File Prior to Visit   Medication Sig Dispense Refill    amLODIPine-benazepriL (Lotrel) 10-20 mg capsule Take 1 capsule by mouth once daily. 90 capsule 3    budesonide-formoteroL (Symbicort) 160-4.5 mcg/actuation  inhaler Inhale 2 puffs 2 times a day. 180 each 3    cyclobenzaprine (Flexeril) 10 mg tablet TAKE 1 TABLET BY MOUTH AS NEEDED AT BEDTIME FOR MUSCLE SPASMS. 30 tablet 0    dapagliflozin propanediol (Farxiga) 10 mg Take 1 tablet (10 mg) by mouth once daily. 90 tablet 3    FreeStyle Ava reader (FreeStyle Ava 2 [place]) misc Use as instructed 1 each 0    FreeStyle Ava sensor system (FreeStyle Ava 2 Sensor) kit Use as instructed to test sugars daily 2 each 11    hydroCHLOROthiazide (HYDRODiuril) 50 mg tablet TAKE 1 TABLET BY MOUTH EVERY DAY 90 tablet 3    losartan (Cozaar) 100 mg tablet TAKE 1 TABLET BY MOUTH EVERY DAY 90 tablet 1    metFORMIN (Glucophage) 1,000 mg tablet Take 1 tablet (1,000 mg) by mouth 2 times a day with meals. 180 tablet 3    OneTouch Ultra Test strip 1 strip 2 times a day.      OneTouch UltraSoft Lancets misc 1 each 2 times a day.      pramipexole (Mirapex) 0.125 mg tablet Take 1 tablet (0.125 mg) by mouth once daily at bedtime. 90 tablet 3    rosuvastatin (Crestor) 10 mg tablet Take 1 tablet (10 mg) by mouth once daily at bedtime. 90 tablet 3    [DISCONTINUED] semaglutide 0.25 mg or 0.5 mg (2 mg/3 mL) pen injector Inject 0.5 mg under the skin 1 (one) time per week.  Replacing Trulicity. 3 mL 3     No current facility-administered medications on file prior to visit.      HISTORICAL THERAPIES:  - Trulicity: tolerance    DRUG INTERACTIONS  - No significant drug-drug interactions exist that require change in therapy  _______________________________________________________________________  PATIENT EDUCATION/GOALS    1. Discussed disease specific goals:   - Fasting B - 130 mg/dL  - Postprandial BG: less than 180 mg/dL  - A1c: less than 7%    2. Patient is currently tolerating Ozempic 0.5 mg once weekly well with no GI upset but diminished appetite control. We will increase Ozempic to 1 mg once weekly and follow up in 1  month.  _______________________________________________________________________    Assessment/Plan   Problem List Items Addressed This Visit       Type 2 diabetes mellitus with microalbuminuria, without long-term current use of insulin ([facility])     Increase Ozempic to 1 mg once weekly.  Continue Farxiga 10 mg once daily.  Continue Metformin 1000 mg BID.         Relevant Medications    semaglutide (OZEMPIC) 1 mg/dose (4 mg/3 mL) pen injector    Other Relevant Orders    Follow Up In Advanced Primary Care - Pharmacy        Continue all meds under the continuation of care with the referring provider and clinical pharmacy team.    Clinical Pharmacist follow-up: 3 weeks    Avinash Fay, PharmD     Verbal consent to manage patient's drug therapy was obtained from [the patient and/or an individual authorized to act on behalf of a patient]. They were informed they may decline to participate or withdraw from participation in pharmacy services at any time.

## 2024-05-03 NOTE — ASSESSMENT & PLAN NOTE
Increase Ozempic to 1 mg once weekly.  Continue Farxiga 10 mg once daily.  Continue Metformin 1000 mg BID.

## 2024-05-06 ENCOUNTER — APPOINTMENT (OUTPATIENT)
Dept: PHARMACY | Facility: HOSPITAL | Age: 54
End: 2024-05-06
Payer: COMMERCIAL

## 2024-06-03 ENCOUNTER — TELEMEDICINE (OUTPATIENT)
Dept: PHARMACY | Facility: HOSPITAL | Age: 54
End: 2024-06-03
Payer: COMMERCIAL

## 2024-06-03 DIAGNOSIS — E11.29 TYPE 2 DIABETES MELLITUS WITH MICROALBUMINURIA, WITHOUT LONG-TERM CURRENT USE OF INSULIN (MULTI): ICD-10-CM

## 2024-06-03 DIAGNOSIS — R80.9 TYPE 2 DIABETES MELLITUS WITH MICROALBUMINURIA, WITHOUT LONG-TERM CURRENT USE OF INSULIN (MULTI): ICD-10-CM

## 2024-06-03 ASSESSMENT — ENCOUNTER SYMPTOMS: DIABETIC ASSOCIATED SYMPTOMS: 0

## 2024-06-03 NOTE — PROGRESS NOTES
Pharmacy Clinic Note    Joe Lopez is a 54 y.o. male was referred to Clinical Pharmacy Team for diabetes management.     Referring Provider: Jeremiah Mccabe, DO    Subjective   No Known Allergies    [id number] - [place], [place] - [address] [place] [address] AT CORNER OF [address] [address]  Phone: [phone] Fax: [phone]    [facility]  [address], [address]  [facility] [address]  Phone: [phone] Fax: [phone]      Diabetes  He presents for his follow-up diabetic visit. He has type 2 diabetes mellitus. His disease course has been stable. There are no hypoglycemic associated symptoms. There are no diabetic associated symptoms. There are no hypoglycemic complications. Risk factors for coronary artery disease include diabetes mellitus, male sex, tobacco exposure and obesity. Current diabetic treatments: Metformin 1000 mg BID; Ozempic 1 mg once weekly. He is compliant with treatment most of the time.       Objective     There were no vitals taken for this visit.     LAB  Lab Results   Component Value Date    BILITOT 0.9 10/13/2023    CALCIUM 9.1 10/13/2023    CO2 23 10/13/2023     10/13/2023    CREATININE 0.73 10/13/2023    GLUCOSE 94 10/13/2023    ALKPHOS 48 10/13/2023    K 4.0 10/13/2023    PROT 6.7 10/13/2023     10/13/2023    AST 27 10/13/2023    ALT 43 10/13/2023    BUN 15 10/13/2023    ANIONGAP 15 10/13/2023    MG 2.01 12/31/2019    ALBUMIN 4.6 10/13/2023    GFRMALE >90 03/03/2023     Lab Results   Component Value Date    TRIG 87 10/13/2023    CHOL 134 10/13/2023    LDLCALC 68 10/13/2023    HDL 48.6 10/13/2023     Lab Results   Component Value Date    HGBA1C 5.9 (H) 10/13/2023       Current Outpatient Medications on File Prior to Visit   Medication Sig Dispense Refill    amLODIPine-benazepriL (Lotrel) 10-20 mg capsule Take 1 capsule by mouth once daily. 90 capsule 3    budesonide-formoteroL (Symbicort) 160-4.5 mcg/actuation inhaler  Inhale 2 puffs 2 times a day. 180 each 3    cyclobenzaprine (Flexeril) 10 mg tablet TAKE 1 TABLET BY MOUTH AS NEEDED AT BEDTIME FOR MUSCLE SPASMS. 30 tablet 0    dapagliflozin propanediol (Farxiga) 10 mg Take 1 tablet (10 mg) by mouth once daily. 90 tablet 3    FreeStyle Ava reader (FreeStyle Ava 2 [place]) misc Use as instructed 1 each 0    FreeStyle Ava sensor system (FreeStyle Ava 2 Sensor) kit Use as instructed to test sugars daily 2 each 11    hydroCHLOROthiazide (HYDRODiuril) 50 mg tablet TAKE 1 TABLET BY MOUTH EVERY DAY 90 tablet 3    losartan (Cozaar) 100 mg tablet TAKE 1 TABLET BY MOUTH EVERY DAY 90 tablet 1    metFORMIN (Glucophage) 1,000 mg tablet Take 1 tablet (1,000 mg) by mouth 2 times a day with meals. 180 tablet 3    OneTouch Ultra Test strip 1 strip 2 times a day.      OneTouch UltraSoft Lancets misc 1 each 2 times a day.      pramipexole (Mirapex) 0.125 mg tablet Take 1 tablet (0.125 mg) by mouth once daily at bedtime. 90 tablet 3    rosuvastatin (Crestor) 10 mg tablet Take 1 tablet (10 mg) by mouth once daily at bedtime. 90 tablet 3    [DISCONTINUED] semaglutide (OZEMPIC) 1 mg/dose (4 mg/3 mL) pen injector Inject 1 mg under the skin 1 (one) time per week. 3 mL 11     No current facility-administered medications on file prior to visit.      HISTORICAL THERAPIES:  - Trulicity: tolerance    DRUG INTERACTIONS  - No significant drug-drug interactions exist that require change in therapy  _______________________________________________________________________  PATIENT EDUCATION/GOALS    1. Discussed disease specific goals:   - Fasting B - 130 mg/dL  - Postprandial BG: less than 180 mg/dL  - A1c: less than 7%    2. Patient is currently tolerating Ozempic 1 mg once weekly well with no GI upset but diminished appetite control. We will increase Ozempic to 2 mg once weekly and follow up in 1 month.  _______________________________________________________________________    Assessment/Plan    Problem List Items Addressed This Visit       Type 2 diabetes mellitus with microalbuminuria, without long-term current use of insulin (Multi)     Increase Ozempic to 2 mg once weekly.  Continue Farxiga 10 mg once daily.  Continue Metformin 1000 mg BID.         Relevant Medications    semaglutide 2 mg/dose (8 mg/3 mL) pen injector (Start on 6/9/2024)    Other Relevant Orders    Follow Up In [facility] - Pharmacy        Continue all meds under the continuation of care with the referring provider and clinical pharmacy team.    Clinical Pharmacist follow-up: 3-4 weeks    Avinash Fay, PharmD     Verbal consent to manage patient's drug therapy was obtained from [the patient and/or an individual authorized to act on behalf of a patient]. They were informed they may decline to participate or withdraw from participation in pharmacy services at any time.

## 2024-06-03 NOTE — ASSESSMENT & PLAN NOTE
Increase Ozempic to 2 mg once weekly.  Continue Farxiga 10 mg once daily.  Continue Metformin 1000 mg BID.

## 2024-06-13 ENCOUNTER — PHARMACY VISIT (OUTPATIENT)
Dept: PHARMACY | Facility: CLINIC | Age: 54
End: 2024-06-13
Payer: COMMERCIAL

## 2024-06-13 DIAGNOSIS — R80.9 TYPE 2 DIABETES MELLITUS WITH MICROALBUMINURIA, WITHOUT LONG-TERM CURRENT USE OF INSULIN (MULTI): ICD-10-CM

## 2024-06-13 DIAGNOSIS — E11.29 TYPE 2 DIABETES MELLITUS WITH MICROALBUMINURIA, WITHOUT LONG-TERM CURRENT USE OF INSULIN (MULTI): ICD-10-CM

## 2024-06-13 PROCEDURE — RXMED WILLOW AMBULATORY MEDICATION CHARGE

## 2024-06-14 ENCOUNTER — APPOINTMENT (OUTPATIENT)
Dept: PRIMARY CARE | Facility: CLINIC | Age: 54
End: 2024-06-14
Payer: COMMERCIAL

## 2024-06-14 VITALS
BODY MASS INDEX: 38.51 KG/M2 | DIASTOLIC BLOOD PRESSURE: 80 MMHG | WEIGHT: 260 LBS | HEIGHT: 69 IN | SYSTOLIC BLOOD PRESSURE: 116 MMHG | HEART RATE: 68 BPM

## 2024-06-14 DIAGNOSIS — G47.33 OSA ON CPAP: ICD-10-CM

## 2024-06-14 DIAGNOSIS — E11.69 HYPERLIPIDEMIA ASSOCIATED WITH TYPE 2 DIABETES MELLITUS (MULTI): ICD-10-CM

## 2024-06-14 DIAGNOSIS — E66.01 CLASS 2 SEVERE OBESITY DUE TO EXCESS CALORIES WITH SERIOUS COMORBIDITY AND BODY MASS INDEX (BMI) OF 38.0 TO 38.9 IN ADULT (MULTI): ICD-10-CM

## 2024-06-14 DIAGNOSIS — E78.5 HYPERLIPIDEMIA ASSOCIATED WITH TYPE 2 DIABETES MELLITUS (MULTI): ICD-10-CM

## 2024-06-14 DIAGNOSIS — Z12.5 PROSTATE CANCER SCREENING: ICD-10-CM

## 2024-06-14 DIAGNOSIS — I10 HYPERTENSION, BENIGN: ICD-10-CM

## 2024-06-14 DIAGNOSIS — R80.9 TYPE 2 DIABETES MELLITUS WITH MICROALBUMINURIA, WITHOUT LONG-TERM CURRENT USE OF INSULIN (MULTI): Primary | ICD-10-CM

## 2024-06-14 DIAGNOSIS — F17.220 CHEWING TOBACCO NICOTINE DEPENDENCE WITHOUT COMPLICATION: ICD-10-CM

## 2024-06-14 DIAGNOSIS — D12.6 TUBULAR ADENOMA OF COLON: ICD-10-CM

## 2024-06-14 DIAGNOSIS — G25.81 RESTLESS LEGS SYNDROME: ICD-10-CM

## 2024-06-14 DIAGNOSIS — J45.40 MODERATE PERSISTENT ASTHMA WITHOUT COMPLICATION (HHS-HCC): ICD-10-CM

## 2024-06-14 DIAGNOSIS — F32.5 MAJOR DEPRESSIVE DISORDER WITH SINGLE EPISODE, IN FULL REMISSION (CMS-HCC): ICD-10-CM

## 2024-06-14 DIAGNOSIS — E11.29 TYPE 2 DIABETES MELLITUS WITH MICROALBUMINURIA, WITHOUT LONG-TERM CURRENT USE OF INSULIN (MULTI): Primary | ICD-10-CM

## 2024-06-14 PROBLEM — E66.812 CLASS 2 SEVERE OBESITY DUE TO EXCESS CALORIES WITH SERIOUS COMORBIDITY AND BODY MASS INDEX (BMI) OF 38.0 TO 38.9 IN ADULT: Status: ACTIVE | Noted: 2024-06-14

## 2024-06-14 PROBLEM — E66.813 CLASS 3 SEVERE OBESITY DUE TO EXCESS CALORIES WITH SERIOUS COMORBIDITY AND BODY MASS INDEX (BMI) OF 40.0 TO 44.9 IN ADULT: Status: RESOLVED | Noted: 2023-09-05 | Resolved: 2024-06-14

## 2024-06-14 PROCEDURE — 99214 OFFICE O/P EST MOD 30 MIN: CPT | Performed by: INTERNAL MEDICINE

## 2024-06-14 PROCEDURE — 4010F ACE/ARB THERAPY RXD/TAKEN: CPT | Performed by: INTERNAL MEDICINE

## 2024-06-14 PROCEDURE — 3008F BODY MASS INDEX DOCD: CPT | Performed by: INTERNAL MEDICINE

## 2024-06-14 PROCEDURE — 3079F DIAST BP 80-89 MM HG: CPT | Performed by: INTERNAL MEDICINE

## 2024-06-14 PROCEDURE — 3074F SYST BP LT 130 MM HG: CPT | Performed by: INTERNAL MEDICINE

## 2024-06-14 NOTE — ASSESSMENT & PLAN NOTE
Well-controlled on Symbicort 160/4.5 2 puffs twice a day with as needed use of albuterol use once last month clinical exam stable asthma control test greater than 25

## 2024-06-14 NOTE — ASSESSMENT & PLAN NOTE
26 pound weight loss since September 2023 with addition of semaglutide titrated to 2 mg daily with metformin along with diet exercise and appetite changes doing well

## 2024-06-14 NOTE — PROGRESS NOTES
"Subjective   Patient ID: Joe Lopez is a 54 y.o. male who presents for Follow-up.    HPI     Review of Systems    Objective   /80 (BP Location: Right arm, Patient Position: Sitting)   Pulse 68   Ht 1.753 m (5' 9\")   Wt 118 kg (260 lb)   BMI 38.40 kg/m²     Physical Exam    Assessment/Plan          "

## 2024-06-14 NOTE — ASSESSMENT & PLAN NOTE
Switch patient from Trulicity causing side effects to semaglutide with titration with pharmacy staff reevaluate with next visit continue metformin 1000 mg twice a day continue Farxiga 10 mg daily for microalbuminuria reevaluate with blood work with follow-up evaluation

## 2024-06-14 NOTE — ASSESSMENT & PLAN NOTE
Follow-up colonoscopy surveillance in 3 years for 8 mm tubular adenoma removed from transverse colon

## 2024-06-14 NOTE — PROGRESS NOTES
"Subjective   Reason for Visit: Jeo Lopez is an 54 y.o. male here for a fu visit.     Past Medical, Surgical, and Family History reviewed and updated in chart.    Reviewed all medications by prescribing practitioner or clinical pharmacist (such as prescriptions, OTCs, herbal therapies and supplements) and documented in the medical record.    HPI    Patient Care Team:  Jeremiah Mccabe DO as PCP - General  [facility]     Review of Systems   All other systems reviewed and are negative.      Objective   Vitals:  /80 (BP Location: Right arm, Patient Position: Sitting)   Pulse 68   Ht 1.753 m (5' 9\")   Wt 118 kg (260 lb)   BMI 38.40 kg/m²       Physical Exam  Vitals and nursing note reviewed.   Constitutional:       General: He is not in acute distress.     Appearance: Normal appearance. He is well-developed. He is obese. He is not toxic-appearing.   HENT:      Head: Normocephalic and atraumatic.      Right Ear: Tympanic membrane and external ear normal.      Left Ear: Tympanic membrane and external ear normal.      Nose: Nose normal.      Mouth/Throat:      Mouth: Mucous membranes are moist.      Pharynx: Oropharynx is clear. No oropharyngeal exudate or posterior oropharyngeal erythema.      Tonsils: No tonsillar exudate. 2+ on the right. 2+ on the left.   Eyes:      Extraocular Movements: Extraocular movements intact.      Conjunctiva/sclera: Conjunctivae normal.   Cardiovascular:      Rate and Rhythm: Normal rate and regular rhythm.      Pulses: Normal pulses.      Heart sounds: Normal heart sounds. No murmur heard.  Pulmonary:      Effort: Pulmonary effort is normal.      Breath sounds: Normal breath sounds.   Abdominal:      General: Abdomen is flat. Bowel sounds are normal.      Palpations: Abdomen is soft.   Musculoskeletal:      Cervical back: Neck supple.   Feet:      Right foot:      Skin integrity: Skin integrity normal. No ulcer, blister, skin breakdown, " erythema, warmth or callus.      Toenail Condition: Right toenails are normal.      Left foot:      Skin integrity: Skin integrity normal. No ulcer, blister, skin breakdown, erythema, warmth or callus.      Toenail Condition: Left toenails are normal.   Lymphadenopathy:      Cervical: No cervical adenopathy.   Skin:     General: Skin is warm and dry.      Capillary Refill: Capillary refill takes more than 3 seconds.      Findings: No rash.   Neurological:      Mental Status: He is alert. Mental status is at baseline.      Sensory: Sensation is intact.   Psychiatric:         Mood and Affect: Mood normal.         Behavior: Behavior normal.         Thought Content: Thought content normal.         Judgment: Judgment normal.         Assessment/Plan   Problem List Items Addressed This Visit       Hyperlipidemia associated with type 2 diabetes mellitus (Multi)    Current Assessment & Plan     Continue rosuvastatin 10 mg daily reevaluate with next visit LDL goal less than 100         Hypertension, benign    Current Assessment & Plan     Blood pressure is improved and stable continue losartan 100 mg daily with hydrochlorothiazide 50 mg daily reevaluate with follow-up blood work         Moderate persistent asthma without complication ([facility])    Current Assessment & Plan     Well-controlled on Symbicort 160/4.5 2 puffs twice a day with as needed use of albuterol use once last month clinical exam stable asthma control test greater than 25         PAPO on CPAP    Current Assessment & Plan     Compliant with regular use of CPAP on a nightly basis         Restless legs syndrome    Current Assessment & Plan     Continue pramipexole 0.125 mg nightly using about once every 3 nights improved with weight loss and improvement in periodic limb movement disorder related to sleep apnea         Type 2 diabetes mellitus with microalbuminuria, without long-term current use of insulin (Multi) - Primary    Current Assessment & Plan     Switch  patient from Trulicity causing side effects to semaglutide with titration with pharmacy staff reevaluate with next visit continue metformin 1000 mg twice a day continue Farxiga 10 mg daily for microalbuminuria reevaluate with blood work with follow-up evaluation         Chewing tobacco nicotine dependence without complication    Current Assessment & Plan     Patient down to 1 can a week continue to encourage evaluation on exam today revealed no changes         Tubular adenoma of colon    Current Assessment & Plan     Follow-up colonoscopy surveillance in 3 years for 8 mm tubular adenoma removed from transverse colon         Major depressive disorder with single episode, in full remission ([facility])    Current Assessment & Plan     Continue to manage off medication at this time         Prostate cancer screening    Current Assessment & Plan     Repeat PSA with next visit         Relevant Orders    PSA    Class 2 severe obesity due to excess calories with serious comorbidity and body mass index (BMI) of 38.0 to 38.9 in adult (Multi)    Current Assessment & Plan     26 pound weight loss since September 2023 with addition of semaglutide titrated to 2 mg daily with metformin along with diet exercise and appetite changes doing well

## 2024-06-14 NOTE — ASSESSMENT & PLAN NOTE
Blood pressure is improved and stable continue losartan 100 mg daily with hydrochlorothiazide 50 mg daily reevaluate with follow-up blood work

## 2024-06-14 NOTE — ASSESSMENT & PLAN NOTE
Continue pramipexole 0.125 mg nightly using about once every 3 nights improved with weight loss and improvement in periodic limb movement disorder related to sleep apnea

## 2024-07-08 ENCOUNTER — APPOINTMENT (OUTPATIENT)
Dept: PHARMACY | Facility: HOSPITAL | Age: 54
End: 2024-07-08
Payer: COMMERCIAL

## 2024-07-08 DIAGNOSIS — R80.9 TYPE 2 DIABETES MELLITUS WITH MICROALBUMINURIA, WITHOUT LONG-TERM CURRENT USE OF INSULIN (MULTI): ICD-10-CM

## 2024-07-08 DIAGNOSIS — E11.29 TYPE 2 DIABETES MELLITUS WITH MICROALBUMINURIA, WITHOUT LONG-TERM CURRENT USE OF INSULIN (MULTI): ICD-10-CM

## 2024-07-08 ASSESSMENT — ENCOUNTER SYMPTOMS: DIABETIC ASSOCIATED SYMPTOMS: 0

## 2024-07-08 NOTE — ASSESSMENT & PLAN NOTE
Continue Ozempic to 2 mg once weekly.  Continue Farxiga 10 mg once daily.  Continue Metformin 1000 mg BID.

## 2024-07-08 NOTE — PROGRESS NOTES
Pharmacy Clinic Note    Joe Lopez is a 54 y.o. male was referred to Clinical Pharmacy Team for diabetes management.     Referring Provider: Jeremiah Mccabe,     Subjective   Allergies   Allergen Reactions    Lisinopril Unknown    Niacin Unknown    Pravastatin Unknown    Simvastatin Unknown       [id number] - [place], [place] - [address] [facility] AT [address]  [address] [facility] [address]  Phone: [phone] Fax: [phone]    [facility]  [address], [address]  [facility] [address]  Phone: [phone] Fax: [phone]      Diabetes  He presents for his follow-up diabetic visit. He has type 2 diabetes mellitus. His disease course has been stable. There are no hypoglycemic associated symptoms. There are no diabetic associated symptoms. There are no hypoglycemic complications. Risk factors for coronary artery disease include diabetes mellitus, male sex, tobacco exposure and obesity. Current diabetic treatments: Metformin 1000 mg BID; Ozempic 2 mg once weekly. He is compliant with treatment all of the time.       Objective     There were no vitals taken for this visit.     LAB  Lab Results   Component Value Date    BILITOT 0.9 10/13/2023    CALCIUM 9.1 10/13/2023    CO2 23 10/13/2023     10/13/2023    CREATININE 0.73 10/13/2023    GLUCOSE 94 10/13/2023    ALKPHOS 48 10/13/2023    K 4.0 10/13/2023    PROT 6.7 10/13/2023     10/13/2023    AST 27 10/13/2023    ALT 43 10/13/2023    BUN 15 10/13/2023    ANIONGAP 15 10/13/2023    MG 2.01 12/31/2019    ALBUMIN 4.6 10/13/2023    GFRMALE >90 03/03/2023     Lab Results   Component Value Date    TRIG 87 10/13/2023    CHOL 134 10/13/2023    LDLCALC 68 10/13/2023    HDL 48.6 10/13/2023     Lab Results   Component Value Date    HGBA1C 5.9 (H) 10/13/2023       Current Outpatient Medications on File Prior to Visit   Medication Sig Dispense Refill    budesonide-formoteroL (Symbicort) 160-4.5 mcg/actuation inhaler  Inhale 2 puffs 2 times a day. 180 each 3    cyclobenzaprine (Flexeril) 10 mg tablet TAKE 1 TABLET BY MOUTH AS NEEDED AT BEDTIME FOR MUSCLE SPASMS. 30 tablet 0    dapagliflozin propanediol (Farxiga) 10 mg Take 1 tablet (10 mg) by mouth once daily. 90 tablet 3    hydroCHLOROthiazide (HYDRODiuril) 50 mg tablet TAKE 1 TABLET BY MOUTH EVERY DAY 90 tablet 3    losartan (Cozaar) 100 mg tablet TAKE 1 TABLET BY MOUTH EVERY DAY 90 tablet 1    metFORMIN (Glucophage) 1,000 mg tablet Take 1 tablet (1,000 mg) by mouth 2 times a day with meals. 180 tablet 3    OneTouch Ultra Test strip 1 strip 2 times a day.      OneTouch UltraSoft Lancets misc 1 each 2 times a day.      pramipexole (Mirapex) 0.125 mg tablet Take 1 tablet (0.125 mg) by mouth once daily at bedtime. 90 tablet 3    rosuvastatin (Crestor) 10 mg tablet Take 1 tablet (10 mg) by mouth once daily at bedtime. 90 tablet 3    semaglutide 2 mg/dose (8 mg/3 mL) pen injector Inject 2 mg under the skin 1 (one) time per week. 9 mL 3     No current facility-administered medications on file prior to visit.      HISTORICAL THERAPIES:  - Trulicity: tolerance    DRUG INTERACTIONS  - No significant drug-drug interactions exist that require change in therapy  _______________________________________________________________________  PATIENT EDUCATION/GOALS    1. Discussed disease specific goals:   - Fasting B - 130 mg/dL  - Postprandial BG: less than 180 mg/dL  - A1c: less than 7%    2. Patient is currently tolerating Ozempic 2 mg once weekly well with no GI upset. Given current tolerance at max dose, we will continue current medication regimen and follow up in 6 months. Plan to continue Farxiga and Metformin at current  _______________________________________________________________________    Assessment/Plan   Problem List Items Addressed This Visit       Type 2 diabetes mellitus with microalbuminuria, without long-term current use of insulin (Multi)     Continue Ozempic to 2 mg  once weekly.  Continue Farxiga 10 mg once daily.  Continue Metformin 1000 mg BID.             Continue all meds under the continuation of care with the referring provider and clinical pharmacy team.    Clinical Pharmacist follow-up: 6 months    Avinash Fay, PharmD     Verbal consent to manage patient's drug therapy was obtained from [the patient and/or an individual authorized to act on behalf of a patient]. They were informed they may decline to participate or withdraw from participation in pharmacy services at any time.

## 2024-07-11 ENCOUNTER — TELEPHONE (OUTPATIENT)
Dept: PRIMARY CARE | Facility: CLINIC | Age: 54
End: 2024-07-11
Payer: COMMERCIAL

## 2024-07-11 NOTE — TELEPHONE ENCOUNTER
He had dizziness x 3 weeks /  he stopped taking the hydrochlorothiazide  &  losartan for 2 weeks &  dizziness went away--he started back on the losartan & hydrochlorothiazide & he is having the dizziness again.    Asking for your recommendation ?

## 2024-07-15 NOTE — TELEPHONE ENCOUNTER
Patient called back and his BP readings were     Friday  121/86 (AM)  125/65 (6;30 PM)  Saturday   108/58 (9;30 Am)  114/70 (8:00 PM)  Sunday   109/73 (AM)

## 2024-07-16 PROCEDURE — RXMED WILLOW AMBULATORY MEDICATION CHARGE

## 2024-07-18 ENCOUNTER — PHARMACY VISIT (OUTPATIENT)
Dept: PHARMACY | Facility: CLINIC | Age: 54
End: 2024-07-18
Payer: COMMERCIAL

## 2024-07-22 ENCOUNTER — TELEPHONE (OUTPATIENT)
Dept: PRIMARY CARE | Facility: CLINIC | Age: 54
End: 2024-07-22
Payer: COMMERCIAL

## 2024-07-22 NOTE — TELEPHONE ENCOUNTER
Patient called and said medication is still making dizzy. Patient was unsure of medication please advise.

## 2024-07-29 ENCOUNTER — LAB (OUTPATIENT)
Dept: LAB | Facility: LAB | Age: 54
End: 2024-07-29
Payer: COMMERCIAL

## 2024-07-29 DIAGNOSIS — E11.65 TYPE 2 DIABETES MELLITUS WITH HYPERGLYCEMIA, WITHOUT LONG-TERM CURRENT USE OF INSULIN (MULTI): ICD-10-CM

## 2024-07-29 DIAGNOSIS — R80.9 TYPE 2 DIABETES MELLITUS WITH MICROALBUMINURIA, WITHOUT LONG-TERM CURRENT USE OF INSULIN (MULTI): ICD-10-CM

## 2024-07-29 DIAGNOSIS — E11.29 TYPE 2 DIABETES MELLITUS WITH MICROALBUMINURIA, WITHOUT LONG-TERM CURRENT USE OF INSULIN (MULTI): ICD-10-CM

## 2024-07-29 DIAGNOSIS — Z12.5 PROSTATE CANCER SCREENING: ICD-10-CM

## 2024-07-29 LAB — PSA SERPL-MCNC: 0.51 NG/ML

## 2024-07-29 PROCEDURE — 36415 COLL VENOUS BLD VENIPUNCTURE: CPT

## 2024-07-29 PROCEDURE — 84153 ASSAY OF PSA TOTAL: CPT

## 2024-07-29 PROCEDURE — 80061 LIPID PANEL: CPT

## 2024-08-02 ENCOUNTER — OFFICE VISIT (OUTPATIENT)
Dept: PRIMARY CARE | Facility: CLINIC | Age: 54
End: 2024-08-02
Payer: COMMERCIAL

## 2024-08-02 VITALS
HEIGHT: 69 IN | DIASTOLIC BLOOD PRESSURE: 80 MMHG | WEIGHT: 259 LBS | SYSTOLIC BLOOD PRESSURE: 122 MMHG | HEART RATE: 67 BPM | BODY MASS INDEX: 38.36 KG/M2

## 2024-08-02 DIAGNOSIS — M54.16 LUMBAR RADICULOPATHY, CHRONIC: ICD-10-CM

## 2024-08-02 DIAGNOSIS — E11.29 TYPE 2 DIABETES MELLITUS WITH MICROALBUMINURIA, WITHOUT LONG-TERM CURRENT USE OF INSULIN (MULTI): ICD-10-CM

## 2024-08-02 DIAGNOSIS — D12.6 TUBULAR ADENOMA OF COLON: ICD-10-CM

## 2024-08-02 DIAGNOSIS — R80.9 TYPE 2 DIABETES MELLITUS WITH MICROALBUMINURIA, WITHOUT LONG-TERM CURRENT USE OF INSULIN (MULTI): Primary | ICD-10-CM

## 2024-08-02 DIAGNOSIS — F32.89 OTHER DEPRESSION: ICD-10-CM

## 2024-08-02 DIAGNOSIS — J45.40 MODERATE PERSISTENT ASTHMA WITHOUT COMPLICATION (HHS-HCC): ICD-10-CM

## 2024-08-02 DIAGNOSIS — G47.33 OSA ON CPAP: ICD-10-CM

## 2024-08-02 DIAGNOSIS — E11.29 TYPE 2 DIABETES MELLITUS WITH MICROALBUMINURIA, WITHOUT LONG-TERM CURRENT USE OF INSULIN (MULTI): Primary | ICD-10-CM

## 2024-08-02 DIAGNOSIS — F32.5 MAJOR DEPRESSIVE DISORDER WITH SINGLE EPISODE, IN FULL REMISSION (CMS-HCC): ICD-10-CM

## 2024-08-02 DIAGNOSIS — E78.5 HYPERLIPIDEMIA ASSOCIATED WITH TYPE 2 DIABETES MELLITUS (MULTI): ICD-10-CM

## 2024-08-02 DIAGNOSIS — G25.81 RESTLESS LEGS SYNDROME: ICD-10-CM

## 2024-08-02 DIAGNOSIS — E11.69 HYPERLIPIDEMIA ASSOCIATED WITH TYPE 2 DIABETES MELLITUS (MULTI): ICD-10-CM

## 2024-08-02 DIAGNOSIS — K76.0 NONALCOHOLIC FATTY LIVER DISEASE: ICD-10-CM

## 2024-08-02 DIAGNOSIS — F17.200 CURRENT SMOKER: ICD-10-CM

## 2024-08-02 DIAGNOSIS — E66.01 CLASS 3 SEVERE OBESITY DUE TO EXCESS CALORIES WITH SERIOUS COMORBIDITY AND BODY MASS INDEX (BMI) OF 40.0 TO 44.9 IN ADULT (MULTI): ICD-10-CM

## 2024-08-02 DIAGNOSIS — R80.9 TYPE 2 DIABETES MELLITUS WITH MICROALBUMINURIA, WITHOUT LONG-TERM CURRENT USE OF INSULIN (MULTI): ICD-10-CM

## 2024-08-02 DIAGNOSIS — E66.01 CLASS 2 SEVERE OBESITY DUE TO EXCESS CALORIES WITH SERIOUS COMORBIDITY AND BODY MASS INDEX (BMI) OF 38.0 TO 38.9 IN ADULT (MULTI): ICD-10-CM

## 2024-08-02 DIAGNOSIS — F17.220 CHEWING TOBACCO NICOTINE DEPENDENCE WITHOUT COMPLICATION: ICD-10-CM

## 2024-08-02 DIAGNOSIS — E11.65 TYPE 2 DIABETES MELLITUS WITH HYPERGLYCEMIA, WITHOUT LONG-TERM CURRENT USE OF INSULIN (MULTI): ICD-10-CM

## 2024-08-02 DIAGNOSIS — E78.5 DYSLIPIDEMIA, GOAL LDL BELOW 70: ICD-10-CM

## 2024-08-02 DIAGNOSIS — I10 HYPERTENSION, BENIGN: ICD-10-CM

## 2024-08-02 DIAGNOSIS — Z12.5 PROSTATE CANCER SCREENING: ICD-10-CM

## 2024-08-02 LAB
CHOLEST SERPL-MCNC: 201 MG/DL (ref 0–199)
CHOLESTEROL/HDL RATIO: 4.5
HDLC SERPL-MCNC: 44.5 MG/DL
LDLC SERPL CALC-MCNC: 127 MG/DL
NON HDL CHOLESTEROL: 157 MG/DL (ref 0–149)
TRIGL SERPL-MCNC: 149 MG/DL (ref 0–149)
VLDL: 30 MG/DL (ref 0–40)

## 2024-08-02 PROCEDURE — 3079F DIAST BP 80-89 MM HG: CPT | Performed by: INTERNAL MEDICINE

## 2024-08-02 PROCEDURE — 90471 IMMUNIZATION ADMIN: CPT | Performed by: INTERNAL MEDICINE

## 2024-08-02 PROCEDURE — 90472 IMMUNIZATION ADMIN EACH ADD: CPT | Performed by: INTERNAL MEDICINE

## 2024-08-02 PROCEDURE — 90677 PCV20 VACCINE IM: CPT | Performed by: INTERNAL MEDICINE

## 2024-08-02 PROCEDURE — 3008F BODY MASS INDEX DOCD: CPT | Performed by: INTERNAL MEDICINE

## 2024-08-02 PROCEDURE — 99214 OFFICE O/P EST MOD 30 MIN: CPT | Performed by: INTERNAL MEDICINE

## 2024-08-02 PROCEDURE — 3074F SYST BP LT 130 MM HG: CPT | Performed by: INTERNAL MEDICINE

## 2024-08-02 PROCEDURE — 90750 HZV VACC RECOMBINANT IM: CPT | Performed by: INTERNAL MEDICINE

## 2024-08-02 RX ORDER — DAPAGLIFLOZIN 10 MG/1
10 TABLET, FILM COATED ORAL DAILY
Qty: 90 TABLET | Refills: 3 | Status: SHIPPED | OUTPATIENT
Start: 2024-08-02 | End: 2025-08-02

## 2024-08-02 RX ORDER — CYCLOBENZAPRINE HCL 10 MG
10 TABLET ORAL NIGHTLY PRN
Qty: 90 TABLET | Refills: 3 | Status: SHIPPED | OUTPATIENT
Start: 2024-08-02 | End: 2025-08-02

## 2024-08-02 RX ORDER — BUDESONIDE AND FORMOTEROL FUMARATE DIHYDRATE 160; 4.5 UG/1; UG/1
2 AEROSOL RESPIRATORY (INHALATION)
Qty: 4 G | Refills: 3 | Status: SHIPPED | OUTPATIENT
Start: 2024-08-02 | End: 2025-08-02

## 2024-08-02 RX ORDER — ALBUTEROL SULFATE 90 UG/1
2 AEROSOL, METERED RESPIRATORY (INHALATION) EVERY 4 HOURS PRN
Qty: 26.8 G | Refills: 3 | Status: SHIPPED | OUTPATIENT
Start: 2024-08-02 | End: 2025-08-02

## 2024-08-02 RX ORDER — METFORMIN HYDROCHLORIDE 1000 MG/1
1000 TABLET ORAL
Qty: 180 TABLET | Refills: 3 | Status: SHIPPED | OUTPATIENT
Start: 2024-08-02

## 2024-08-02 RX ORDER — ROSUVASTATIN CALCIUM 10 MG/1
10 TABLET, COATED ORAL NIGHTLY
Qty: 90 TABLET | Refills: 3 | Status: SHIPPED | OUTPATIENT
Start: 2024-08-02

## 2024-08-02 NOTE — PROGRESS NOTES
"Subjective   Patient ID: Joe Lopez is a 54 y.o. male who presents for Dizziness (Think it was his medications that were making him dizzy stopped all Blood pressure meds. And has not had any dizziness since. ).    HPI     Review of Systems    Objective   /80   Pulse 67   Ht 1.753 m (5' 9\")   Wt 117 kg (259 lb)   BMI 38.25 kg/m²     Physical Exam    Assessment/Plan          "

## 2024-08-02 NOTE — ASSESSMENT & PLAN NOTE
Improvement in the treatment of morbid obesity continue with diet exercise changes optimal control of diabetes medications on semaglutide 2 mg weekly and Farxiga 10 mg daily

## 2024-08-02 NOTE — ASSESSMENT & PLAN NOTE
Stabilized with use of antiobesity medications and control of diabetes mellitus working on treatment of morbid obesity no evidence of ongoing chronic liver dysfunction repeat liver functions

## 2024-08-02 NOTE — ASSESSMENT & PLAN NOTE
Hemoglobin A1c 5.9 with last evaluation continue metformin 1000 mg twice a day Farxiga 10 mg daily and semaglutide 2 mg weekly.  Patient losing insurance at end of September will most likely discontinue Farxiga semaglutide and monitor as best we can with generic medications reevaluate next visit update Shingrix vaccination covered by insurance today we will see if patient can get second dose 40 lose insurance in September at the end of September

## 2024-08-02 NOTE — ASSESSMENT & PLAN NOTE
Refill Symbicort but patient well-controlled at this time.  Not using rescue inhaler but will refill rescue inhaler albuterol before patient loses insurance updated Prevnar 20 vaccine for prevention of pneumonia in office today

## 2024-08-02 NOTE — PROGRESS NOTES
Lifestyle Recommendations  I recommend a whole-food plant-based diet, an eating pattern that encourages the consumption of unrefined plant foods (such as fruits, vegetables, tubers, whole grains, legumes, nuts and seeds) and discourages meats, dairy products, eggs and processed foods.     The AHA/ACC recommends that the patient consume a dietary pattern that emphasizes intake of vegetables, fruits, and whole grains; includes low-fat dairy products, poultry, fish, legumes, non-tropical vegetable oils, and nuts; and limits intake of sodium, sweets, sugar-sweetened beverages, and red meats.  Adapt this dietary pattern to appropriate calorie requirements (a 500-750 kcal/day deficit to loose weight), personal and cultural food preferences, and nutrition therapy for other medical conditions (including diabetes).  Achieve this pattern by following plans such as the [place] Mediterranean, DASH dietary pattern, or AHA diet.     Engage in 2 hours and 30 minutes per week of moderate-intensity physical activity, or 1 hour and 15 minutes (75 minutes) per week of vigorous-intensity aerobic physical activity, or an equivalent combination of moderate and vigorous-intensity aerobic physical activity. Aerobic activity should be performed in episodes of at least 10 minutes preferably spread throughout the week.     Adhering to a heart healthy diet, regular exercise habits, avoidance of tobacco products, and maintenance of a healthy weight are crucial components of their heart disease risk reduction.Subjective   Reason for Visit: Joe Lopez is an 54 y.o. male here for a Medicare Wellness visit.     Past Medical, Surgical, and Family History reviewed and updated in chart.    Reviewed all medications by prescribing practitioner or clinical pharmacist (such as prescriptions, OTCs, herbal therapies and supplements) and documented in the medical record.    HPI    Patient Care Team:  Jeremiah Mccabe DO as PCP - General  Jeremiah RODRIGUEZ  "DO Eliot as PCP - [employer] PCP     Review of Systems   All other systems reviewed and are negative.      Objective   Vitals:  /80   Pulse 67   Ht 1.753 m (5' 9\")   Wt 117 kg (259 lb)   BMI 38.25 kg/m²       Physical Exam  Vitals and nursing note reviewed.   Constitutional:       General: He is not in acute distress.     Appearance: Normal appearance. He is well-developed. He is obese. He is not toxic-appearing.   HENT:      Head: Normocephalic and atraumatic.      Right Ear: Tympanic membrane and external ear normal.      Left Ear: Tympanic membrane and external ear normal.      Nose: Nose normal.      Mouth/Throat:      Mouth: Mucous membranes are moist.      Pharynx: Oropharynx is clear. No oropharyngeal exudate or posterior oropharyngeal erythema.      Tonsils: No tonsillar exudate. 2+ on the right. 2+ on the left.   Eyes:      Extraocular Movements: Extraocular movements intact.      Conjunctiva/sclera: Conjunctivae normal.   Cardiovascular:      Rate and Rhythm: Regular rhythm.      Pulses: Normal pulses.      Heart sounds: Normal heart sounds. No murmur heard.  Pulmonary:      Effort: Pulmonary effort is normal.      Breath sounds: Normal breath sounds.   Abdominal:      General: Abdomen is flat. Bowel sounds are normal.      Palpations: Abdomen is soft.   Musculoskeletal:      Cervical back: Neck supple.   Feet:      Right foot:      Skin integrity: Skin integrity normal. No ulcer, blister, skin breakdown, erythema, warmth or callus.      Toenail Condition: Right toenails are normal.      Left foot:      Skin integrity: Skin integrity normal. No ulcer, blister, skin breakdown, erythema, warmth or callus.      Toenail Condition: Left toenails are normal.   Lymphadenopathy:      Cervical: No cervical adenopathy.   Skin:     General: Skin is warm and dry.      Capillary Refill: Capillary refill takes more than 3 seconds.      Findings: No rash.   Neurological:      Mental Status: He is alert. " Mental status is at baseline.      Sensory: Sensation is intact.   Psychiatric:         Mood and Affect: Mood normal.         Behavior: Behavior normal.         Thought Content: Thought content normal.         Judgment: Judgment normal.         Assessment/Plan   Problem List Items Addressed This Visit             ICD-10-CM    Hyperlipidemia associated with type 2 diabetes mellitus (Multi) E11.69, E78.5     Optimal LDL cholesterol on rosuvastatin 10 mg daily continue         Relevant Medications    rosuvastatin (Crestor) 10 mg tablet    cyclobenzaprine (Flexeril) 10 mg tablet    Hypertension, benign I10     Patient blood pressure medications at this time due to significant orthostasis in the setting of weight loss with significant improvement in diabetes care on semaglutide and Farxiga patient is losing job of 28 years end of September losing insurance will have blood work tomorrow completed we will continue to monitor blood pressures at home off losartan and hydrochlorothiazide at this time         Relevant Medications    cyclobenzaprine (Flexeril) 10 mg tablet    Moderate persistent asthma without complication ([facility]) J45.40     Refill Symbicort but patient well-controlled at this time.  Not using rescue inhaler but will refill rescue inhaler albuterol before patient loses insurance updated Prevnar 20 vaccine for prevention of pneumonia in office today         Relevant Medications    budesonide-formoteroL (Symbicort) 160-4.5 mcg/actuation inhaler    cyclobenzaprine (Flexeril) 10 mg tablet    Nonalcoholic fatty liver disease K76.0     Stabilized with use of antiobesity medications and control of diabetes mellitus working on treatment of morbid obesity no evidence of ongoing chronic liver dysfunction repeat liver functions         PAPO on CPAP G47.33     Optimal continue nighttime use of CPAP on a regular basis         Relevant Medications    cyclobenzaprine (Flexeril) 10 mg tablet    Restless legs syndrome G25.81      Discontinued off pramipexole stable         Relevant Medications    cyclobenzaprine (Flexeril) 10 mg tablet    Type 2 diabetes mellitus with microalbuminuria, without long-term current use of insulin (Multi) - Primary E11.29, R80.9     Hemoglobin A1c 5.9 with last evaluation continue metformin 1000 mg twice a day Farxiga 10 mg daily and semaglutide 2 mg weekly.  Patient losing insurance at end of September will most likely discontinue Farxiga semaglutide and monitor as best we can with generic medications reevaluate next visit update Shingrix vaccination covered by insurance today we will see if patient can get second dose 40 lose insurance in September at the end of September         Relevant Medications    dapagliflozin propanediol (Farxiga) 10 mg    metFORMIN (Glucophage) 1,000 mg tablet    semaglutide 2 mg/dose (8 mg/3 mL) pen injector (Start on 8/4/2024)    cyclobenzaprine (Flexeril) 10 mg tablet    Other Relevant Orders    Comprehensive Metabolic Panel    Hemoglobin A1C    Lipid Panel    Albumin-Creatinine Ratio, Urine Random    Chewing tobacco nicotine dependence without complication F17.220     Continue to work at chewing tobacco dependence         Relevant Medications    cyclobenzaprine (Flexeril) 10 mg tablet    Lumbar radiculopathy, chronic M54.16     Continue cyclobenzaprine 10 mg nightly as needed         Relevant Medications    cyclobenzaprine (Flexeril) 10 mg tablet    Tubular adenoma of colon D12.6     Okay for surveillance colonoscopy in 2026         Major depressive disorder with single episode, in full remission (CMS-HCC) F32.5     No major depression at this time continue to monitor closely         Prostate cancer screening Z12.5     PSA stable at this time 0.5 continue with annual screening         Class 2 severe obesity due to excess calories with serious comorbidity and body mass index (BMI) of 38.0 to 38.9 in adult (Multi) E66.01, Z68.38     Improvement in the treatment of morbid obesity  continue with diet exercise changes optimal control of diabetes medications on semaglutide 2 mg weekly and Farxiga 10 mg daily          Other Visit Diagnoses         Codes    Type 2 diabetes mellitus with hyperglycemia, without long-term current use of insulin (Multi)     E11.65    Relevant Medications    dapagliflozin propanediol (Farxiga) 10 mg    metFORMIN (Glucophage) 1,000 mg tablet    cyclobenzaprine (Flexeril) 10 mg tablet    Other Relevant Orders    Comprehensive Metabolic Panel    Hemoglobin A1C    Lipid Panel    Albumin-Creatinine Ratio, Urine Random    Dyslipidemia, goal LDL below 70     E78.5    Relevant Medications    cyclobenzaprine (Flexeril) 10 mg tablet    Other depression     F32.89    Relevant Medications    cyclobenzaprine (Flexeril) 10 mg tablet    Class 3 severe obesity due to excess calories with serious comorbidity and body mass index (BMI) of 40.0 to 44.9 in adult (Multi)     E66.01, Z68.41    Relevant Medications    cyclobenzaprine (Flexeril) 10 mg tablet    Current smoker     F17.200    Relevant Medications    cyclobenzaprine (Flexeril) 10 mg tablet

## 2024-08-02 NOTE — ASSESSMENT & PLAN NOTE
Patient blood pressure medications at this time due to significant orthostasis in the setting of weight loss with significant improvement in diabetes care on semaglutide and Farxiga patient is losing job of 28 years end of September losing insurance will have blood work tomorrow completed we will continue to monitor blood pressures at home off losartan and hydrochlorothiazide at this time

## 2024-08-05 ENCOUNTER — LAB (OUTPATIENT)
Dept: LAB | Facility: LAB | Age: 54
End: 2024-08-05
Payer: COMMERCIAL

## 2024-08-05 DIAGNOSIS — E11.65 TYPE 2 DIABETES MELLITUS WITH HYPERGLYCEMIA, WITHOUT LONG-TERM CURRENT USE OF INSULIN (MULTI): ICD-10-CM

## 2024-08-05 DIAGNOSIS — E11.29 TYPE 2 DIABETES MELLITUS WITH MICROALBUMINURIA, WITHOUT LONG-TERM CURRENT USE OF INSULIN (MULTI): ICD-10-CM

## 2024-08-05 DIAGNOSIS — R80.9 TYPE 2 DIABETES MELLITUS WITH MICROALBUMINURIA, WITHOUT LONG-TERM CURRENT USE OF INSULIN (MULTI): ICD-10-CM

## 2024-08-05 LAB
ALBUMIN SERPL BCP-MCNC: 4.1 G/DL (ref 3.4–5)
ALP SERPL-CCNC: 51 U/L (ref 33–120)
ALT SERPL W P-5'-P-CCNC: 33 U/L (ref 10–52)
ANION GAP SERPL CALC-SCNC: 12 MMOL/L (ref 10–20)
AST SERPL W P-5'-P-CCNC: 22 U/L (ref 9–39)
BILIRUB SERPL-MCNC: 0.7 MG/DL (ref 0–1.2)
BUN SERPL-MCNC: 13 MG/DL (ref 6–23)
CALCIUM SERPL-MCNC: 9.3 MG/DL (ref 8.6–10.3)
CHLORIDE SERPL-SCNC: 105 MMOL/L (ref 98–107)
CO2 SERPL-SCNC: 27 MMOL/L (ref 21–32)
CREAT SERPL-MCNC: 0.84 MG/DL (ref 0.5–1.3)
CREAT UR-MCNC: 174.6 MG/DL (ref 20–370)
EGFRCR SERPLBLD CKD-EPI 2021: >90 ML/MIN/1.73M*2
EST. AVERAGE GLUCOSE BLD GHB EST-MCNC: 120 MG/DL
GLUCOSE SERPL-MCNC: 76 MG/DL (ref 74–99)
HBA1C MFR BLD: 5.8 %
MICROALBUMIN UR-MCNC: 27.1 MG/L
MICROALBUMIN/CREAT UR: 15.5 UG/MG CREAT
POTASSIUM SERPL-SCNC: 4.2 MMOL/L (ref 3.5–5.3)
PROT SERPL-MCNC: 6.4 G/DL (ref 6.4–8.2)
SODIUM SERPL-SCNC: 140 MMOL/L (ref 136–145)

## 2024-08-05 PROCEDURE — 82570 ASSAY OF URINE CREATININE: CPT

## 2024-08-05 PROCEDURE — 36415 COLL VENOUS BLD VENIPUNCTURE: CPT

## 2024-08-05 PROCEDURE — 83036 HEMOGLOBIN GLYCOSYLATED A1C: CPT

## 2024-08-05 PROCEDURE — 82043 UR ALBUMIN QUANTITATIVE: CPT

## 2024-08-05 PROCEDURE — 80053 COMPREHEN METABOLIC PANEL: CPT

## 2024-08-06 ENCOUNTER — TELEPHONE (OUTPATIENT)
Dept: PRIMARY CARE | Facility: CLINIC | Age: 54
End: 2024-08-06
Payer: COMMERCIAL

## 2024-08-06 NOTE — TELEPHONE ENCOUNTER
----- Message from Jeremiah Mccabe sent at 8/6/2024 11:51 AM EDT -----  Lab work reveals hemoglobin A1c improved to 5.8No evidence of protein in the urine at this time liver and kidney functions all look well notify patient of the results to continue on current therapy as long as possible

## 2024-08-09 ENCOUNTER — APPOINTMENT (OUTPATIENT)
Dept: PRIMARY CARE | Facility: CLINIC | Age: 54
End: 2024-08-09
Payer: COMMERCIAL

## 2024-08-12 ENCOUNTER — PHARMACY VISIT (OUTPATIENT)
Dept: PHARMACY | Facility: CLINIC | Age: 54
End: 2024-08-12
Payer: COMMERCIAL

## 2024-08-12 PROCEDURE — RXMED WILLOW AMBULATORY MEDICATION CHARGE

## 2024-09-03 PROCEDURE — RXMED WILLOW AMBULATORY MEDICATION CHARGE

## 2024-09-05 ENCOUNTER — PHARMACY VISIT (OUTPATIENT)
Dept: PHARMACY | Facility: CLINIC | Age: 54
End: 2024-09-05
Payer: COMMERCIAL

## 2024-09-16 ENCOUNTER — APPOINTMENT (OUTPATIENT)
Dept: PRIMARY CARE | Facility: CLINIC | Age: 54
End: 2024-09-16
Payer: COMMERCIAL

## 2024-09-30 ENCOUNTER — APPOINTMENT (OUTPATIENT)
Dept: PRIMARY CARE | Facility: CLINIC | Age: 54
End: 2024-09-30
Payer: COMMERCIAL

## 2024-11-30 NOTE — PROGRESS NOTES
Pharmacy Clinic Note    Joe Lopez is a 53 y.o. male was referred to Clinical Pharmacy Team for diabetes management.     Referring Provider: Jeremiah Mccabe, DO    Subjective   No Known Allergies    [facility] #[id number] - [place], [place] - [address] [place] [address] AT [address] OF [address] [place] [id number]  Phone: [phone] Fax: [phone]      Diabetes  He presents for his initial diabetic visit. He has type 2 diabetes mellitus. His disease course has been stable. There are no hypoglycemic associated symptoms. There are no diabetic associated symptoms. There are no hypoglycemic complications. Risk factors for coronary artery disease include diabetes mellitus, male sex, tobacco exposure and obesity. Current diabetic treatments: Metformin 1000 mg BID; Mounjaro 2.5 mg weekly (to start 9/9) He is compliant with treatment all of the time.       Objective     There were no vitals taken for this visit.     LAB  Lab Results   Component Value Date    BILITOT 0.7 03/03/2023    CALCIUM 9.3 03/03/2023    CO2 28 03/03/2023     03/03/2023    CREATININE 0.72 03/03/2023    GLUCOSE 93 03/03/2023    ALKPHOS 54 03/03/2023    K 3.3 (L) 03/03/2023    PROT 7.3 03/03/2023     03/03/2023    AST 20 03/03/2023    ALT 37 03/03/2023    BUN 16 03/03/2023    ANIONGAP 13 03/03/2023    MG 2.01 12/31/2019    ALBUMIN 4.5 03/03/2023    GFRMALE >90 03/03/2023     Lab Results   Component Value Date    TRIG 108 03/03/2023    CHOL 142 03/03/2023    HDL 45.1 03/03/2023     Lab Results   Component Value Date    HGBA1C 6.0 (A) 03/03/2023       Current Outpatient Medications on File Prior to Visit   Medication Sig Dispense Refill    amLODIPine (Norvasc) 5 mg tablet Take 1 tablet (5 mg) by mouth once daily. 90 tablet 3    budesonide-formoteroL (Symbicort) 160-4.5 mcg/actuation inhaler Inhale 2 puffs 2 times a day. 180 each 3    chlorthalidone (Hygroton) 25 mg tablet Take 1 tablet (25 mg) by mouth once daily. 90 tablet 3     cyclobenzaprine (Flexeril) 10 mg tablet Take 1 tablet (10 mg) by mouth as needed at bedtime for muscle spasms. 30 tablet 0    escitalopram (Lexapro) 10 mg tablet Take 1 tablet (10 mg) by mouth once daily. 90 tablet 3    fluticasone propion-salmeteroL (Advair Diskus) 250-50 mcg/dose diskus inhaler Inhale 1 puff 2 times a day. 60 each 3    lisinopril 40 mg tablet Take 1 tablet (40 mg) by mouth once daily. 90 tablet 3    metFORMIN (Glucophage) 1,000 mg tablet Take 1 tablet (1,000 mg) by mouth 2 times a day with meals. 180 tablet 3    OneTouch Ultra Test strip 1 strip 2 times a day.      OneTouch UltraSoft Lancets misc 1 each 2 times a day.      pramipexole (Mirapex) 0.125 mg tablet Take 1 tablet (0.125 mg) by mouth once daily at bedtime. 90 tablet 3    rosuvastatin (Crestor) 10 mg tablet Take 1 tablet (10 mg) by mouth once daily at bedtime. 90 tablet 3    tirzepatide (Mounjaro) 2.5 mg/0.5 mL pen injector Inject 2.5 mg under the skin 1 (one) time per week. 2 mL 1     No current facility-administered medications on file prior to visit.        DRUG INTERACTIONS  - No significant drug-drug interactions exist that require change in therapy  _______________________________________________________________________  PATIENT EDUCATION/GOALS    1. Discussed disease specific goals:   - Fasting B - 130 mg/dL  - Postprandial BG: less than 180 mg/dL  - A1c: less than 7%    2. Since last visit, patient mistakenly administered Mounjaro 2.5 mg once daily for 4 days rather than once weekly and visited the ED due to stomach pain. Pain has resolved and patient is willing to restart the medication with written administration of once weekly starting the week of 23. Plan to follow up with patient after 3 weeks of administration to assess tolerance.  _______________________________________________________________________    Assessment/Plan   Problem List Items Addressed This Visit          Endocrine/Metabolic    Type 2 diabetes  mellitus with hyperglycemia, without long-term current use of insulin (CMS/[facility])        Continue all meds under the continuation of care with the referring provider and clinical pharmacy team.    Clinical Pharmacist follow-up: 10/9/23    Avinash Fay, Dang     Verbal consent to manage patient's drug therapy was obtained from [the patient and/or an individual authorized to act on behalf of a patient]. They were informed they may decline to participate or withdraw from participation in pharmacy services at any time.   Private car

## 2025-01-07 DIAGNOSIS — E11.65 TYPE 2 DIABETES MELLITUS WITH HYPERGLYCEMIA, WITHOUT LONG-TERM CURRENT USE OF INSULIN: Primary | ICD-10-CM

## 2025-01-13 ENCOUNTER — APPOINTMENT (OUTPATIENT)
Dept: PHARMACY | Facility: HOSPITAL | Age: 55
End: 2025-01-13
Payer: COMMERCIAL

## 2025-02-03 ENCOUNTER — APPOINTMENT (OUTPATIENT)
Dept: PRIMARY CARE | Facility: CLINIC | Age: 55
End: 2025-02-03
Payer: COMMERCIAL

## 2025-02-03 ENCOUNTER — CLINICAL SUPPORT (OUTPATIENT)
Dept: PHARMACY | Facility: HOSPITAL | Age: 55
End: 2025-02-03
Payer: COMMERCIAL

## 2025-02-03 DIAGNOSIS — E11.29 TYPE 2 DIABETES MELLITUS WITH MICROALBUMINURIA, WITHOUT LONG-TERM CURRENT USE OF INSULIN (MULTI): Primary | ICD-10-CM

## 2025-02-03 DIAGNOSIS — E11.65 TYPE 2 DIABETES MELLITUS WITH HYPERGLYCEMIA, WITHOUT LONG-TERM CURRENT USE OF INSULIN: ICD-10-CM

## 2025-02-03 DIAGNOSIS — R80.9 TYPE 2 DIABETES MELLITUS WITH MICROALBUMINURIA, WITHOUT LONG-TERM CURRENT USE OF INSULIN (MULTI): Primary | ICD-10-CM

## 2025-02-05 ASSESSMENT — ENCOUNTER SYMPTOMS: DIABETIC ASSOCIATED SYMPTOMS: 0

## 2025-02-05 NOTE — PROGRESS NOTES
Pharmacy Clinic Note    Joe Lopez is a 54 y.o. male was referred to Clinical Pharmacy Team for diabetes management.     Referring Provider: Jeremiah Mccabe,     Subjective   Allergies   Allergen Reactions    Lisinopril Unknown    Niacin Unknown    Pravastatin Unknown    Simvastatin Unknown       [id number] - [place], [place] - [address] AT [address]  [address] [address]  Phone: [phone] Fax: [phone]    [facility]  [address], [address]  [facility] [address]  Phone: [phone] Fax: [phone]      Diabetes  He presents for his follow-up diabetic visit. He has type 2 diabetes mellitus. His disease course has been stable. There are no hypoglycemic associated symptoms. There are no diabetic associated symptoms. There are no hypoglycemic complications. Risk factors for coronary artery disease include diabetes mellitus, male sex, tobacco exposure and obesity. Current diabetic treatments: Metformin 1000 mg BID. He is compliant with treatment all of the time.       Objective     There were no vitals taken for this visit.     LAB  Lab Results   Component Value Date    BILITOT 0.7 08/05/2024    CALCIUM 9.3 08/05/2024    CO2 27 08/05/2024     08/05/2024    CREATININE 0.84 08/05/2024    GLUCOSE 76 08/05/2024    ALKPHOS 51 08/05/2024    K 4.2 08/05/2024    PROT 6.4 08/05/2024     08/05/2024    AST 22 08/05/2024    ALT 33 08/05/2024    BUN 13 08/05/2024    ANIONGAP 12 08/05/2024    MG 2.01 12/31/2019    ALBUMIN 4.1 08/05/2024    GFRMALE >90 03/03/2023     Lab Results   Component Value Date    TRIG 149 07/29/2024    CHOL 201 (H) 07/29/2024    LDLCALC 127 (H) 07/29/2024    HDL 44.5 07/29/2024     Lab Results   Component Value Date    HGBA1C 5.8 (H) 08/05/2024       Current Outpatient Medications on File Prior to Visit   Medication Sig Dispense Refill    albuterol (Ventolin HFA) 90 mcg/actuation inhaler Inhale 2 puffs every 4 hours  if needed for wheezing or shortness of breath. 26.8 g 3    budesonide-formoteroL (Symbicort) 160-4.5 mcg/actuation inhaler Inhale 2 puffs 2 times a day. 4 g 3    cyclobenzaprine (Flexeril) 10 mg tablet Take 1 tablet (10 mg) by mouth as needed at bedtime for muscle spasms. 90 tablet 3    dapagliflozin propanediol (Farxiga) 10 mg Take 1 tablet (10 mg) by mouth once daily. 90 tablet 3    metFORMIN (Glucophage) 1,000 mg tablet Take 1 tablet (1,000 mg) by mouth 2 times daily (morning and late afternoon). 180 tablet 3    OneTouch Ultra Test strip 1 strip 2 times a day.      OneTouch UltraSoft Lancets misc 1 each 2 times a day.      rosuvastatin (Crestor) 10 mg tablet Take 1 tablet (10 mg) by mouth once daily at bedtime. 90 tablet 3    semaglutide 2 mg/dose (8 mg/3 mL) pen injector Inject 2 mg under the skin 1 (one) time per week. 3 mL 11     No current facility-administered medications on file prior to visit.      HISTORICAL THERAPIES:  - Trulicity: tolerance    DRUG INTERACTIONS  - No significant drug-drug interactions exist that require change in therapy  _______________________________________________________________________  PATIENT EDUCATION/GOALS    1. Discussed disease specific goals:   - Fasting B - 130 mg/dL  - Postprandial BG: less than 180 mg/dL  - A1c: less than 7%    2. Since last visit, patient's insurance coverage has changed and he is no longer able to continue Ozempic and Farxiga due to high co-pays. He is currently managed via Metformin alone. We will continue Metformin at this time, but we will plan to attempt to enroll in  PAP for Farxiga and Ozempic if patient can provide tax info.  _______________________________________________________________________    Assessment/Plan   Problem List Items Addressed This Visit    None  Visit Diagnoses       Type 2 diabetes mellitus with hyperglycemia, without long-term current use of insulin                 Continue all meds under the continuation of care  with the referring provider and clinical pharmacy team.    Clinical Pharmacist follow-up: 6 months    Avinash Fay, Dang     Verbal consent to manage patient's drug therapy was obtained from [the patient and/or an individual authorized to act on behalf of a patient]. They were informed they may decline to participate or withdraw from participation in pharmacy services at any time.

## 2025-03-05 DIAGNOSIS — I10 HYPERTENSION, BENIGN: ICD-10-CM

## 2025-03-05 RX ORDER — LOSARTAN POTASSIUM 100 MG/1
100 TABLET ORAL DAILY
Qty: 90 TABLET | Refills: 3 | Status: SHIPPED | OUTPATIENT
Start: 2025-03-05

## 2025-04-23 ENCOUNTER — APPOINTMENT (OUTPATIENT)
Dept: PHARMACY | Facility: HOSPITAL | Age: 55
End: 2025-04-23
Payer: COMMERCIAL

## 2025-04-23 ENCOUNTER — APPOINTMENT (OUTPATIENT)
Dept: PRIMARY CARE | Facility: CLINIC | Age: 55
End: 2025-04-23
Payer: COMMERCIAL

## 2025-04-23 VITALS
WEIGHT: 258 LBS | BODY MASS INDEX: 38.21 KG/M2 | HEART RATE: 95 BPM | DIASTOLIC BLOOD PRESSURE: 94 MMHG | SYSTOLIC BLOOD PRESSURE: 166 MMHG | HEIGHT: 69 IN

## 2025-04-23 DIAGNOSIS — E66.812 CLASS 2 SEVERE OBESITY DUE TO EXCESS CALORIES WITH SERIOUS COMORBIDITY AND BODY MASS INDEX (BMI) OF 38.0 TO 38.9 IN ADULT: ICD-10-CM

## 2025-04-23 DIAGNOSIS — E78.5 HYPERLIPIDEMIA ASSOCIATED WITH TYPE 2 DIABETES MELLITUS: ICD-10-CM

## 2025-04-23 DIAGNOSIS — F32.5 MAJOR DEPRESSIVE DISORDER WITH SINGLE EPISODE, IN FULL REMISSION (CMS-HCC): ICD-10-CM

## 2025-04-23 DIAGNOSIS — G47.33 OSA ON CPAP: ICD-10-CM

## 2025-04-23 DIAGNOSIS — J45.40 MODERATE PERSISTENT ASTHMA WITHOUT COMPLICATION (HHS-HCC): ICD-10-CM

## 2025-04-23 DIAGNOSIS — Z12.5 PROSTATE CANCER SCREENING: ICD-10-CM

## 2025-04-23 DIAGNOSIS — E11.29 TYPE 2 DIABETES MELLITUS WITH MICROALBUMINURIA, WITHOUT LONG-TERM CURRENT USE OF INSULIN (MULTI): Primary | ICD-10-CM

## 2025-04-23 DIAGNOSIS — E11.69 HYPERLIPIDEMIA ASSOCIATED WITH TYPE 2 DIABETES MELLITUS: ICD-10-CM

## 2025-04-23 DIAGNOSIS — E11.65 TYPE 2 DIABETES MELLITUS WITH HYPERGLYCEMIA, WITHOUT LONG-TERM CURRENT USE OF INSULIN: ICD-10-CM

## 2025-04-23 DIAGNOSIS — R80.9 TYPE 2 DIABETES MELLITUS WITH MICROALBUMINURIA, WITHOUT LONG-TERM CURRENT USE OF INSULIN (MULTI): Primary | ICD-10-CM

## 2025-04-23 DIAGNOSIS — E66.01 CLASS 2 SEVERE OBESITY DUE TO EXCESS CALORIES WITH SERIOUS COMORBIDITY AND BODY MASS INDEX (BMI) OF 38.0 TO 38.9 IN ADULT: ICD-10-CM

## 2025-04-23 DIAGNOSIS — I10 HYPERTENSION, BENIGN: ICD-10-CM

## 2025-04-23 PROCEDURE — 99214 OFFICE O/P EST MOD 30 MIN: CPT | Performed by: INTERNAL MEDICINE

## 2025-04-23 PROCEDURE — 3077F SYST BP >= 140 MM HG: CPT | Performed by: INTERNAL MEDICINE

## 2025-04-23 PROCEDURE — 4010F ACE/ARB THERAPY RXD/TAKEN: CPT | Performed by: INTERNAL MEDICINE

## 2025-04-23 PROCEDURE — 3080F DIAST BP >= 90 MM HG: CPT | Performed by: INTERNAL MEDICINE

## 2025-04-23 PROCEDURE — 3008F BODY MASS INDEX DOCD: CPT | Performed by: INTERNAL MEDICINE

## 2025-04-23 RX ORDER — DAPAGLIFLOZIN 10 MG/1
10 TABLET, FILM COATED ORAL DAILY
Qty: 90 TABLET | Refills: 3 | Status: SHIPPED | OUTPATIENT
Start: 2025-04-23 | End: 2026-04-23

## 2025-04-23 RX ORDER — ROSUVASTATIN CALCIUM 10 MG/1
10 TABLET, COATED ORAL NIGHTLY
Qty: 90 TABLET | Refills: 3 | Status: SHIPPED | OUTPATIENT
Start: 2025-04-23

## 2025-04-23 RX ORDER — METFORMIN HYDROCHLORIDE 1000 MG/1
1000 TABLET ORAL
Qty: 180 TABLET | Refills: 3 | Status: SHIPPED | OUTPATIENT
Start: 2025-04-23

## 2025-04-23 RX ORDER — LOSARTAN POTASSIUM 100 MG/1
100 TABLET ORAL DAILY
Qty: 90 TABLET | Refills: 3 | Status: SHIPPED | OUTPATIENT
Start: 2025-04-23

## 2025-04-23 RX ORDER — METFORMIN HYDROCHLORIDE 1000 MG/1
1000 TABLET ORAL
Qty: 180 TABLET | Refills: 3 | Status: SHIPPED | OUTPATIENT
Start: 2025-04-23 | End: 2025-04-23 | Stop reason: SDUPTHER

## 2025-04-23 ASSESSMENT — ENCOUNTER SYMPTOMS
DEPRESSION: 0
LOSS OF SENSATION IN FEET: 0
OCCASIONAL FEELINGS OF UNSTEADINESS: 0

## 2025-04-23 ASSESSMENT — ANXIETY QUESTIONNAIRES
1. FEELING NERVOUS, ANXIOUS, OR ON EDGE: NOT AT ALL
3. WORRYING TOO MUCH ABOUT DIFFERENT THINGS: NOT AT ALL
7. FEELING AFRAID AS IF SOMETHING AWFUL MIGHT HAPPEN: NOT AT ALL
6. BECOMING EASILY ANNOYED OR IRRITABLE: NOT AT ALL
4. TROUBLE RELAXING: NOT AT ALL
IF YOU CHECKED OFF ANY PROBLEMS ON THIS QUESTIONNAIRE, HOW DIFFICULT HAVE THESE PROBLEMS MADE IT FOR YOU TO DO YOUR WORK, TAKE CARE OF THINGS AT HOME, OR GET ALONG WITH OTHER PEOPLE: NOT DIFFICULT AT ALL
5. BEING SO RESTLESS THAT IT IS HARD TO SIT STILL: NOT AT ALL
2. NOT BEING ABLE TO STOP OR CONTROL WORRYING: NOT AT ALL
GAD7 TOTAL SCORE: 0

## 2025-04-23 ASSESSMENT — COLUMBIA-SUICIDE SEVERITY RATING SCALE - C-SSRS
1. IN THE PAST MONTH, HAVE YOU WISHED YOU WERE DEAD OR WISHED YOU COULD GO TO SLEEP AND NOT WAKE UP?: NO
6. HAVE YOU EVER DONE ANYTHING, STARTED TO DO ANYTHING, OR PREPARED TO DO ANYTHING TO END YOUR LIFE?: NO
2. HAVE YOU ACTUALLY HAD ANY THOUGHTS OF KILLING YOURSELF?: NO

## 2025-04-23 ASSESSMENT — PATIENT HEALTH QUESTIONNAIRE - PHQ9
2. FEELING DOWN, DEPRESSED OR HOPELESS: NOT AT ALL
1. LITTLE INTEREST OR PLEASURE IN DOING THINGS: NOT AT ALL
SUM OF ALL RESPONSES TO PHQ9 QUESTIONS 1 AND 2: 0

## 2025-04-23 NOTE — ASSESSMENT & PLAN NOTE
The patient received Current weight:    Weight change since last visit (-) denotes wt loss     Weight loss needed to achieve BMI 25:   Lbs  Weight loss needed to achieve BMI 30:   Lbs    Provided instructions on dietary changes  Provided instructions on exercise  Advised to Increase physical activity because they have an above normal BMI.

## 2025-04-23 NOTE — PROGRESS NOTES
"Subjective   Patient ID: Joe Lopez is a 55 y.o. male who presents for Follow-up.    HPI     Review of Systems    Objective   BP (!) 183/101   Pulse 95   Ht 1.753 m (5' 9\")   Wt 117 kg (258 lb)   BMI 38.10 kg/m²     Physical Exam    Assessment/Plan          "

## 2025-04-23 NOTE — ASSESSMENT & PLAN NOTE
Reevaluate PSA for prostate cancer screening  Orders:    Comprehensive Metabolic Panel; Future    Hemoglobin A1C; Future    Lipid Panel; Future    Albumin-Creatinine Ratio, Urine Random; Future    PSA; Future

## 2025-04-23 NOTE — ASSESSMENT & PLAN NOTE
Blood pressure elevated today restart losartan 100 mg daily reevaluate next visit  Orders:    losartan (Cozaar) 100 mg tablet; Take 1 tablet (100 mg) by mouth once daily.    Comprehensive Metabolic Panel; Future    Hemoglobin A1C; Future    Lipid Panel; Future    Albumin-Creatinine Ratio, Urine Random; Future    Follow Up In [facility] - PCP - Established; Future

## 2025-04-23 NOTE — ASSESSMENT & PLAN NOTE
Restart rosuvastatin 10 mg daily LDL goal less than 70 reevaluate next visit  Orders:    rosuvastatin (Crestor) 10 mg tablet; Take 1 tablet (10 mg) by mouth once daily at bedtime.    Comprehensive Metabolic Panel; Future    Hemoglobin A1C; Future    Lipid Panel; Future    Albumin-Creatinine Ratio, Urine Random; Future

## 2025-04-23 NOTE — PROGRESS NOTES
"Subjective   Reason for Visit: Joe Lopez is an 55 y.o. male here for a Medicare Wellness visit.     Past Medical, Surgical, and Family History reviewed and updated in chart.         HPI    Patient Care Team:  Jeremiah Mccabe DO as PCP - General     Review of Systems   All other systems reviewed and are negative.      Objective   Vitals:  BP (!) 166/94   Pulse 95   Ht 1.753 m (5' 9\")   Wt 117 kg (258 lb)   BMI 38.10 kg/m²       Physical Exam  Vitals and nursing note reviewed.   Constitutional:       General: He is not in acute distress.     Appearance: Normal appearance. He is well-developed. He is obese. He is not toxic-appearing.   HENT:      Head: Normocephalic and atraumatic.      Right Ear: Tympanic membrane and external ear normal.      Left Ear: Tympanic membrane and external ear normal.      Nose: Nose normal.      Mouth/Throat:      Mouth: Mucous membranes are moist.      Pharynx: Oropharynx is clear. No oropharyngeal exudate or posterior oropharyngeal erythema.      Tonsils: No tonsillar exudate. 2+ on the right. 2+ on the left.   Eyes:      Extraocular Movements: Extraocular movements intact.      Conjunctiva/sclera: Conjunctivae normal.   Cardiovascular:      Rate and Rhythm: Normal rate and regular rhythm.      Pulses: Normal pulses.      Heart sounds: Normal heart sounds. No murmur heard.  Pulmonary:      Effort: Pulmonary effort is normal.      Breath sounds: Normal breath sounds.   Abdominal:      General: Abdomen is flat. Bowel sounds are normal.      Palpations: Abdomen is soft.   Musculoskeletal:      Cervical back: Neck supple.   Feet:      Right foot:      Skin integrity: Skin integrity normal. No ulcer, blister, skin breakdown, erythema, warmth or callus.      Toenail Condition: Right toenails are normal.      Left foot:      Skin integrity: Skin integrity normal. No ulcer, blister, skin breakdown, erythema, warmth or callus.      Toenail Condition: Left toenails are normal. "   Lymphadenopathy:      Cervical: No cervical adenopathy.   Skin:     General: Skin is warm and dry.      Capillary Refill: Capillary refill takes more than 3 seconds.      Findings: No rash.   Neurological:      Mental Status: He is alert. Mental status is at baseline.      Sensory: Sensation is intact.   Psychiatric:         Mood and Affect: Mood normal.         Behavior: Behavior normal.         Thought Content: Thought content normal.         Judgment: Judgment normal.     Lifestyle Recommendations  I recommend a whole-food plant-based diet, an eating pattern that encourages the consumption of unrefined plant foods (such as fruits, vegetables, tubers, whole grains, legumes, nuts and seeds) and discourages meats, dairy products, eggs and processed foods.     The AHA/ACC recommends that the patient consume a dietary pattern that emphasizes intake of vegetables, fruits, and whole grains; includes low-fat dairy products, poultry, fish, legumes, non-tropical vegetable oils, and nuts; and limits intake of sodium, sweets, sugar-sweetened beverages, and red meats.  Adapt this dietary pattern to appropriate calorie requirements (a 500-750 kcal/day deficit to loose weight), personal and cultural food preferences, and nutrition therapy for other medical conditions (including diabetes).  Achieve this pattern by following plans such as the [place] Mediterranean, DASH dietary pattern, or AHA diet.     Engage in 2 hours and 30 minutes per week of moderate-intensity physical activity, or 1 hour and 15 minutes (75 minutes) per week of vigorous-intensity aerobic physical activity, or an equivalent combination of moderate and vigorous-intensity aerobic physical activity. Aerobic activity should be performed in episodes of at least 10 minutes preferably spread throughout the week.     Adhering to a heart healthy diet, regular exercise habits, avoidance of tobacco products, and maintenance of a healthy weight are crucial components of  their heart disease risk reduction.    Patient Health Questionnaire-2 Score: 0 (4/23/2025  2:35 PM).  This indicates a positive screen but may not meet the criteria for a clinical depression diagnosis. Symptoms were reviewed with Joe.  Follow-up within the next 3 months is recommended to re-assess symptoms and monitor mental health status.    Assessment & Plan  Type 2 diabetes mellitus with microalbuminuria, without long-term current use of insulin ([facility])  Patient reinitiated on insurance.  Restarted dapagliflozin 10 mg daily hopeful to start semaglutide as well reevaluate blood work was previously well-controlled at 5.4  Orders:    Follow Up In [facility] - PCP - Established    Comprehensive Metabolic Panel; Future    Hemoglobin A1C; Future    Lipid Panel; Future    Albumin-Creatinine Ratio, Urine Random; Future    Follow Up In [facility] - PCP - Established; Future    Hypertension, benign  Blood pressure elevated today restart losartan 100 mg daily reevaluate next visit  Orders:    losartan (Cozaar) 100 mg tablet; Take 1 tablet (100 mg) by mouth once daily.    Comprehensive Metabolic Panel; Future    Hemoglobin A1C; Future    Lipid Panel; Future    Albumin-Creatinine Ratio, Urine Random; Future    Follow Up In [facility] - PCP - Established; Future    Type 2 diabetes mellitus with hyperglycemia, without long-term current use of insulin  Restart dapagliflozin 10 mg daily and continue with metformin 1000 mg twice a day evaluate patient for semaglutide  Orders:    dapagliflozin propanediol (Farxiga) 10 mg tablet; Take 1 tablet (10 mg) by mouth once daily.    Comprehensive Metabolic Panel; Future    Hemoglobin A1C; Future    Lipid Panel; Future    Albumin-Creatinine Ratio, Urine Random; Future    metFORMIN (Glucophage) 1,000 mg tablet; Take 1 tablet (1,000 mg) by mouth 2 times daily (morning and late afternoon).    Hyperlipidemia associated with type 2 diabetes mellitus  Restart  rosuvastatin 10 mg daily LDL goal less than 70 reevaluate next visit  Orders:    rosuvastatin (Crestor) 10 mg tablet; Take 1 tablet (10 mg) by mouth once daily at bedtime.    Comprehensive Metabolic Panel; Future    Hemoglobin A1C; Future    Lipid Panel; Future    Albumin-Creatinine Ratio, Urine Random; Future    PAPO on CPAP  Compliant with regular use of CPAP nightly basis       Major depressive disorder with single episode, in full remission ([facility])  Continue to monitor off medication at this time stable       Prostate cancer screening  Reevaluate PSA for prostate cancer screening  Orders:    Comprehensive Metabolic Panel; Future    Hemoglobin A1C; Future    Lipid Panel; Future    Albumin-Creatinine Ratio, Urine Random; Future    PSA; Future    Moderate persistent asthma without complication ([facility])  Symptoms stable with as needed use of albuterol 1 time tobacco cessation former smoker       Class 2 severe obesity due to excess calories with serious comorbidity and body mass index (BMI) of 38.0 to 38.9 in adult  The patient received Current weight:    Weight change since last visit (-) denotes wt loss     Weight loss needed to achieve BMI 25:   Lbs  Weight loss needed to achieve BMI 30:   Lbs    Provided instructions on dietary changes  Provided instructions on exercise  Advised to Increase physical activity because they have an above normal BMI.

## 2025-04-23 NOTE — ASSESSMENT & PLAN NOTE
Patient reinitiated on insurance.  Restarted dapagliflozin 10 mg daily hopeful to start semaglutide as well reevaluate blood work was previously well-controlled at 5.4  Orders:    Follow Up In [facility] - PCP - Established    Comprehensive Metabolic Panel; Future    Hemoglobin A1C; Future    Lipid Panel; Future    Albumin-Creatinine Ratio, Urine Random; Future    Follow Up In [facility] - PCP - Established; Future

## 2025-04-30 ENCOUNTER — APPOINTMENT (OUTPATIENT)
Dept: PHARMACY | Facility: HOSPITAL | Age: 55
End: 2025-04-30
Payer: COMMERCIAL

## 2025-05-19 ENCOUNTER — TELEPHONE (OUTPATIENT)
Dept: PRIMARY CARE | Facility: CLINIC | Age: 55
End: 2025-05-19
Payer: COMMERCIAL

## 2025-05-19 NOTE — TELEPHONE ENCOUNTER
Called patient   Left message to have patient call office back  We received a message from answering service that there is an issue with lab work? I need to get more information what is wrong with the lab work? There is an order in the system from april2025

## 2025-05-23 ENCOUNTER — APPOINTMENT (OUTPATIENT)
Dept: PRIMARY CARE | Facility: CLINIC | Age: 55
End: 2025-05-23
Payer: COMMERCIAL

## 2025-05-23 VITALS
DIASTOLIC BLOOD PRESSURE: 84 MMHG | BODY MASS INDEX: 38.21 KG/M2 | HEART RATE: 76 BPM | HEIGHT: 69 IN | SYSTOLIC BLOOD PRESSURE: 150 MMHG | WEIGHT: 258 LBS

## 2025-05-23 DIAGNOSIS — G47.33 OSA ON CPAP: ICD-10-CM

## 2025-05-23 DIAGNOSIS — E78.5 HYPERLIPIDEMIA ASSOCIATED WITH TYPE 2 DIABETES MELLITUS: ICD-10-CM

## 2025-05-23 DIAGNOSIS — I10 HYPERTENSION, BENIGN: Primary | ICD-10-CM

## 2025-05-23 DIAGNOSIS — E11.29 TYPE 2 DIABETES MELLITUS WITH MICROALBUMINURIA, WITHOUT LONG-TERM CURRENT USE OF INSULIN (MULTI): ICD-10-CM

## 2025-05-23 DIAGNOSIS — R80.9 TYPE 2 DIABETES MELLITUS WITH MICROALBUMINURIA, WITHOUT LONG-TERM CURRENT USE OF INSULIN (MULTI): ICD-10-CM

## 2025-05-23 DIAGNOSIS — E11.69 HYPERLIPIDEMIA ASSOCIATED WITH TYPE 2 DIABETES MELLITUS: ICD-10-CM

## 2025-05-23 PROCEDURE — 3008F BODY MASS INDEX DOCD: CPT | Performed by: INTERNAL MEDICINE

## 2025-05-23 PROCEDURE — 3079F DIAST BP 80-89 MM HG: CPT | Performed by: INTERNAL MEDICINE

## 2025-05-23 PROCEDURE — 4010F ACE/ARB THERAPY RXD/TAKEN: CPT | Performed by: INTERNAL MEDICINE

## 2025-05-23 PROCEDURE — 3077F SYST BP >= 140 MM HG: CPT | Performed by: INTERNAL MEDICINE

## 2025-05-23 PROCEDURE — 99213 OFFICE O/P EST LOW 20 MIN: CPT | Performed by: INTERNAL MEDICINE

## 2025-05-23 RX ORDER — AMLODIPINE BESYLATE 5 MG/1
5 TABLET ORAL DAILY
Qty: 90 TABLET | Refills: 3 | Status: SHIPPED | OUTPATIENT
Start: 2025-05-23 | End: 2026-05-23

## 2025-05-23 NOTE — PROGRESS NOTES
"Subjective   Reason for Visit: Joe Lopez is an 55 y.o. male here for a follow-up of hypertension visit.     Past Medical, Surgical, and Family History reviewed and updated in chart.    Reviewed all medications by prescribing practitioner or clinical pharmacist (such as prescriptions, OTCs, herbal therapies and supplements) and documented in the medical record.    HPI    Patient Care Team:  Jeremiah Mccabe DO as PCP - General     Review of Systems   All other systems reviewed and are negative.      Objective   Vitals:  /84   Pulse 76   Ht 1.753 m (5' 9\")   Wt 117 kg (258 lb)   BMI 38.10 kg/m²       Physical Exam  Vitals and nursing note reviewed.   Constitutional:       General: He is not in acute distress.     Appearance: Normal appearance. He is well-developed. He is obese. He is not toxic-appearing.   HENT:      Head: Normocephalic and atraumatic.      Right Ear: Tympanic membrane and external ear normal.      Left Ear: Tympanic membrane and external ear normal.      Nose: Nose normal.      Mouth/Throat:      Mouth: Mucous membranes are moist.      Pharynx: Oropharynx is clear. No oropharyngeal exudate or posterior oropharyngeal erythema.      Tonsils: No tonsillar exudate. 2+ on the right. 2+ on the left.   Eyes:      Extraocular Movements: Extraocular movements intact.      Conjunctiva/sclera: Conjunctivae normal.   Cardiovascular:      Rate and Rhythm: Normal rate and regular rhythm.      Pulses: Normal pulses.      Heart sounds: Normal heart sounds. No murmur heard.  Pulmonary:      Effort: Pulmonary effort is normal.      Breath sounds: Normal breath sounds.   Abdominal:      General: Abdomen is flat. Bowel sounds are normal.      Palpations: Abdomen is soft.   Musculoskeletal:      Cervical back: Neck supple.   Feet:      Right foot:      Skin integrity: Skin integrity normal. No ulcer, blister, skin breakdown, erythema, warmth or callus.      Toenail Condition: Right toenails are " normal.      Left foot:      Skin integrity: Skin integrity normal. No ulcer, blister, skin breakdown, erythema, warmth or callus.      Toenail Condition: Left toenails are normal.   Lymphadenopathy:      Cervical: No cervical adenopathy.   Skin:     General: Skin is warm and dry.      Capillary Refill: Capillary refill takes more than 3 seconds.      Findings: No rash.   Neurological:      Mental Status: He is alert. Mental status is at baseline.      Sensory: Sensation is intact.   Psychiatric:         Mood and Affect: Mood normal.         Behavior: Behavior normal.         Thought Content: Thought content normal.         Judgment: Judgment normal.     Lifestyle Recommendations  I recommend a whole-food plant-based diet, an eating pattern that encourages the consumption of unrefined plant foods (such as fruits, vegetables, tubers, whole grains, legumes, nuts and seeds) and discourages meats, dairy products, eggs and processed foods.     The AHA/ACC recommends that the patient consume a dietary pattern that emphasizes intake of vegetables, fruits, and whole grains; includes low-fat dairy products, poultry, fish, legumes, non-tropical vegetable oils, and nuts; and limits intake of sodium, sweets, sugar-sweetened beverages, and red meats.  Adapt this dietary pattern to appropriate calorie requirements (a 500-750 kcal/day deficit to loose weight), personal and cultural food preferences, and nutrition therapy for other medical conditions (including diabetes).  Achieve this pattern by following plans such as the [place] Mediterranean, DASH dietary pattern, or AHA diet.     Engage in 2 hours and 30 minutes per week of moderate-intensity physical activity, or 1 hour and 15 minutes (75 minutes) per week of vigorous-intensity aerobic physical activity, or an equivalent combination of moderate and vigorous-intensity aerobic physical activity. Aerobic activity should be performed in episodes of at least 10 minutes preferably  spread throughout the week.     Adhering to a heart healthy diet, regular exercise habits, avoidance of tobacco products, and maintenance of a healthy weight are crucial components of their heart disease risk reduction.    Assessment & Plan  Type 2 diabetes mellitus with microalbuminuria, without long-term current use of insulin ([facility])    Orders:    Follow Up In [facility] - PCP - Established    Hypertension, benign  Blood pressures continue to remain markedly elevated despite reinitiation of losartan 100 mg daily will add amlodipine 5 mg daily reevaluate in 4 weeks  Orders:    Follow Up In [facility] - PCP - Established    amLODIPine (Norvasc) 5 mg tablet; Take 1 tablet (5 mg) by mouth once daily.    Follow Up In [facility] - PCP - Established; Future    Follow Up In [facility] - PCP - Established; Future    Hyperlipidemia associated with type 2 diabetes mellitus  Encourage patient to obtain his fasting lab work to evaluate diabetes and profile follow-up in 4 weeks       PAPO on CPAP  Encouraged patient to continue to use CPAP on a nightly basis reevaluate next visit

## 2025-05-23 NOTE — ASSESSMENT & PLAN NOTE
Blood pressures continue to remain markedly elevated despite reinitiation of losartan 100 mg daily will add amlodipine 5 mg daily reevaluate in 4 weeks  Orders:    Follow Up In [facility] - PCP - Established    amLODIPine (Norvasc) 5 mg tablet; Take 1 tablet (5 mg) by mouth once daily.    Follow Up In [facility] - PCP - Established; Future    Follow Up In [facility] - PCP - Established; Future

## 2025-05-23 NOTE — ASSESSMENT & PLAN NOTE
Encourage patient to obtain his fasting lab work to evaluate diabetes and profile follow-up in 4 weeks

## 2025-05-23 NOTE — PROGRESS NOTES
"Subjective   Patient ID: Joe Lopez is a 55 y.o. male who presents for Follow-up.    HPI     Review of Systems    Objective   /84   Pulse 76   Ht 1.753 m (5' 9\")   Wt 117 kg (258 lb)   BMI 38.10 kg/m²     Physical Exam    Assessment/Plan          "

## 2025-06-03 DIAGNOSIS — E11.29 TYPE 2 DIABETES MELLITUS WITH MICROALBUMINURIA, WITHOUT LONG-TERM CURRENT USE OF INSULIN (MULTI): ICD-10-CM

## 2025-06-03 DIAGNOSIS — R80.9 TYPE 2 DIABETES MELLITUS WITH MICROALBUMINURIA, WITHOUT LONG-TERM CURRENT USE OF INSULIN (MULTI): ICD-10-CM

## 2025-06-03 RX ORDER — SEMAGLUTIDE 2.68 MG/ML
2 INJECTION, SOLUTION SUBCUTANEOUS
Qty: 3 ML | Refills: 11 | Status: SHIPPED | OUTPATIENT
Start: 2025-06-08 | End: 2026-06-08

## 2025-06-10 LAB
ALBUMIN SERPL-MCNC: 4.5 G/DL (ref 3.6–5.1)
ALBUMIN/CREAT UR: 36 MG/G CREAT
ALP SERPL-CCNC: 52 U/L (ref 35–144)
ALT SERPL-CCNC: 35 U/L (ref 9–46)
ANION GAP SERPL CALCULATED.4IONS-SCNC: 14 MMOL/L (CALC) (ref 7–17)
AST SERPL-CCNC: 24 U/L (ref 10–35)
BILIRUB SERPL-MCNC: 0.8 MG/DL (ref 0.2–1.2)
BUN SERPL-MCNC: 12 MG/DL (ref 7–25)
CALCIUM SERPL-MCNC: 9 MG/DL (ref 8.6–10.3)
CHLORIDE SERPL-SCNC: 102 MMOL/L (ref 98–110)
CHOLEST SERPL-MCNC: 143 MG/DL
CHOLEST/HDLC SERPL: 2.8 (CALC)
CO2 SERPL-SCNC: 21 MMOL/L (ref 20–32)
CREAT SERPL-MCNC: 0.76 MG/DL (ref 0.7–1.3)
CREAT UR-MCNC: 28 MG/DL (ref 20–320)
EGFRCR SERPLBLD CKD-EPI 2021: 106 ML/MIN/1.73M2
EST. AVERAGE GLUCOSE BLD GHB EST-MCNC: 126 MG/DL
EST. AVERAGE GLUCOSE BLD GHB EST-SCNC: 7 MMOL/L
GLUCOSE SERPL-MCNC: 87 MG/DL (ref 65–99)
HBA1C MFR BLD: 6 %
HDLC SERPL-MCNC: 51 MG/DL
LDLC SERPL CALC-MCNC: 74 MG/DL (CALC)
MICROALBUMIN UR-MCNC: 1 MG/DL
NONHDLC SERPL-MCNC: 92 MG/DL (CALC)
POTASSIUM SERPL-SCNC: 4.4 MMOL/L (ref 3.5–5.3)
PROT SERPL-MCNC: 6.7 G/DL (ref 6.1–8.1)
PSA SERPL-MCNC: 0.71 NG/ML
SODIUM SERPL-SCNC: 137 MMOL/L (ref 135–146)
TRIGL SERPL-MCNC: 95 MG/DL

## 2025-06-26 ENCOUNTER — APPOINTMENT (OUTPATIENT)
Dept: PRIMARY CARE | Facility: CLINIC | Age: 55
End: 2025-06-26
Payer: COMMERCIAL

## 2025-06-26 VITALS
HEART RATE: 85 BPM | HEIGHT: 69 IN | SYSTOLIC BLOOD PRESSURE: 160 MMHG | DIASTOLIC BLOOD PRESSURE: 90 MMHG | WEIGHT: 283 LBS | BODY MASS INDEX: 41.92 KG/M2

## 2025-06-26 DIAGNOSIS — E11.29 TYPE 2 DIABETES MELLITUS WITH MICROALBUMINURIA, WITHOUT LONG-TERM CURRENT USE OF INSULIN (MULTI): Primary | ICD-10-CM

## 2025-06-26 DIAGNOSIS — F32.5 MAJOR DEPRESSIVE DISORDER WITH SINGLE EPISODE, IN FULL REMISSION: ICD-10-CM

## 2025-06-26 DIAGNOSIS — E66.2 CLASS 3 OBESITY WITH ALVEOLAR HYPOVENTILATION, SERIOUS COMORBIDITY, AND BODY MASS INDEX (BMI) OF 40.0 TO 44.9 IN ADULT: ICD-10-CM

## 2025-06-26 DIAGNOSIS — E11.29 TYPE 2 DIABETES MELLITUS WITH MICROALBUMINURIA, WITHOUT LONG-TERM CURRENT USE OF INSULIN (MULTI): ICD-10-CM

## 2025-06-26 DIAGNOSIS — I10 HYPERTENSION, BENIGN: ICD-10-CM

## 2025-06-26 DIAGNOSIS — R80.9 TYPE 2 DIABETES MELLITUS WITH MICROALBUMINURIA, WITHOUT LONG-TERM CURRENT USE OF INSULIN (MULTI): Primary | ICD-10-CM

## 2025-06-26 DIAGNOSIS — E78.5 HYPERLIPIDEMIA ASSOCIATED WITH TYPE 2 DIABETES MELLITUS: ICD-10-CM

## 2025-06-26 DIAGNOSIS — Z12.5 PROSTATE CANCER SCREENING: ICD-10-CM

## 2025-06-26 DIAGNOSIS — E11.69 HYPERLIPIDEMIA ASSOCIATED WITH TYPE 2 DIABETES MELLITUS: ICD-10-CM

## 2025-06-26 DIAGNOSIS — G47.33 OSA ON CPAP: ICD-10-CM

## 2025-06-26 DIAGNOSIS — R80.9 TYPE 2 DIABETES MELLITUS WITH MICROALBUMINURIA, WITHOUT LONG-TERM CURRENT USE OF INSULIN (MULTI): ICD-10-CM

## 2025-06-26 DIAGNOSIS — E66.813 CLASS 3 OBESITY WITH ALVEOLAR HYPOVENTILATION, SERIOUS COMORBIDITY, AND BODY MASS INDEX (BMI) OF 40.0 TO 44.9 IN ADULT: ICD-10-CM

## 2025-06-26 PROCEDURE — 99213 OFFICE O/P EST LOW 20 MIN: CPT | Performed by: INTERNAL MEDICINE

## 2025-06-26 PROCEDURE — 3077F SYST BP >= 140 MM HG: CPT | Performed by: INTERNAL MEDICINE

## 2025-06-26 PROCEDURE — 4010F ACE/ARB THERAPY RXD/TAKEN: CPT | Performed by: INTERNAL MEDICINE

## 2025-06-26 PROCEDURE — 3080F DIAST BP >= 90 MM HG: CPT | Performed by: INTERNAL MEDICINE

## 2025-06-26 PROCEDURE — 3008F BODY MASS INDEX DOCD: CPT | Performed by: INTERNAL MEDICINE

## 2025-06-26 RX ORDER — SEMAGLUTIDE 2.68 MG/ML
2 INJECTION, SOLUTION SUBCUTANEOUS
Qty: 3 ML | Refills: 11 | Status: SHIPPED | OUTPATIENT
Start: 2025-06-29 | End: 2026-06-29

## 2025-06-26 RX ORDER — AMLODIPINE BESYLATE 10 MG/1
10 TABLET ORAL DAILY
Qty: 90 TABLET | Refills: 3 | Status: SHIPPED | OUTPATIENT
Start: 2025-06-26 | End: 2026-06-26

## 2025-06-26 RX ORDER — CYCLOBENZAPRINE HCL 10 MG
10 TABLET ORAL 3 TIMES DAILY PRN
Qty: 30 TABLET | Refills: 1 | Status: SHIPPED | OUTPATIENT
Start: 2025-06-26 | End: 2025-08-25

## 2025-06-26 ASSESSMENT — ENCOUNTER SYMPTOMS: BACK PAIN: 1

## 2025-06-26 NOTE — ASSESSMENT & PLAN NOTE
Patient with multiple risk factors yet insurance not covering semaglutide which is known to have significant improvements in reducing cardiovascular disease obstructive sleep apnea morbid obesity diabetes mellitus impaired glucose tolerance and insulin resistance patient high risk for cardiovascular events another plea has been made for patient to continue taking semaglutide 2 mg subcu weekly patient has substantial 30 pound weight loss with this medication with improvement in all his numbers and indices including blood pressure and sleep apnea.  It is against the standard of care the patient cannot obtain this medication to manage his diabetes given its significant benefits and his previous improvement while on the medication in the past should not be taken away from him a letter has been sent to his insurance company for serious consideration  Orders:    cyclobenzaprine (Flexeril) 10 mg tablet; Take 1 tablet (10 mg) by mouth 3 times a day as needed for muscle spasms.    amLODIPine (Norvasc) 10 mg tablet; Take 1 tablet (10 mg) by mouth once daily.    Follow Up In [facility] - PCP - Established; Future

## 2025-06-26 NOTE — ASSESSMENT & PLAN NOTE
Persistently elevated continue losartan 100 mg daily increase amlodipine from 5 to 10 mg daily did not tolerate trial of thiazide diuretic in the past reevaluate in 3 months mild microalbuminuria noted but improved on Farxiga  Orders:    Follow Up In [facility] - PCP - Established    cyclobenzaprine (Flexeril) 10 mg tablet; Take 1 tablet (10 mg) by mouth 3 times a day as needed for muscle spasms.    amLODIPine (Norvasc) 10 mg tablet; Take 1 tablet (10 mg) by mouth once daily.    Follow Up In [facility] - PCP - Established; Future     No

## 2025-06-26 NOTE — ASSESSMENT & PLAN NOTE
PT/PTA met face to face to discuss pt's treatment plan and progress towards established goals.  Continue with current PT POC with focus on balance, endurance.  Check BP.  Patient will be seen by physical therapist at least every sixth treatment or 30 days, whichever occurs first.    Ama Zuniga, PTA  09/05/2023     Optimal LDL cholesterol on rosuvastatin 10 mg daily continue  Orders:    cyclobenzaprine (Flexeril) 10 mg tablet; Take 1 tablet (10 mg) by mouth 3 times a day as needed for muscle spasms.    amLODIPine (Norvasc) 10 mg tablet; Take 1 tablet (10 mg) by mouth once daily.    Follow Up In [facility] - PCP - Established; Future

## 2025-06-26 NOTE — ASSESSMENT & PLAN NOTE
Patient not doing as well without coverage of semaglutide 2 mg weekly with weight BMI increase of 38-41 despite improvements in overall blood sugar with dapagliflozin and metformin worsening of hypertension follow-up in 3 months

## 2025-06-26 NOTE — ASSESSMENT & PLAN NOTE
Hampered by lack of use of semaglutide for weight loss with proven studies showing significant improvement in severe obstructive sleep apnea and reducing the risk of sudden cardiac death with the use of GLP-1 agonists and hypertensive heart disease particularly diastolic dysfunction patient compliant with regular use of CPAP nightly basis in the setting of obesity hypoventilation  Orders:    cyclobenzaprine (Flexeril) 10 mg tablet; Take 1 tablet (10 mg) by mouth 3 times a day as needed for muscle spasms.    amLODIPine (Norvasc) 10 mg tablet; Take 1 tablet (10 mg) by mouth once daily.    Follow Up In [facility] - PCP - Established; Future

## 2025-06-26 NOTE — ASSESSMENT & PLAN NOTE
PSA stable continue with annual evaluation  Orders:    cyclobenzaprine (Flexeril) 10 mg tablet; Take 1 tablet (10 mg) by mouth 3 times a day as needed for muscle spasms.    amLODIPine (Norvasc) 10 mg tablet; Take 1 tablet (10 mg) by mouth once daily.    Follow Up In [facility] - PCP - Established; Future

## 2025-06-26 NOTE — PROGRESS NOTES
"Subjective   Reason for Visit: Joe Lopez is an 55 y.o. male here for a Medicare Wellness visit.     Past Medical, Surgical, and Family History reviewed and updated in chart.    Reviewed all medications by prescribing practitioner or clinical pharmacist (such as prescriptions, OTCs, herbal therapies and supplements) and documented in the medical record.    Back Pain  This is a chronic problem. The current episode started 1 to 4 weeks ago. The problem occurs constantly. The problem has been waxing and waning since onset. The pain is present in the lumbar spine. The quality of the pain is described as aching. The pain is at a severity of 5/10. The pain is moderate. The pain is Worse during the day. The symptoms are aggravated by bending and standing. Stiffness is present In the morning and all day. Risk factors include obesity, sedentary lifestyle and lack of exercise. He has tried ice, muscle relaxant, walking, analgesics, heat and home exercises for the symptoms. The treatment provided mild relief.       Patient Care Team:  Jeremiah Mccabe DO as PCP - General     Review of Systems   Musculoskeletal:  Positive for back pain.   All other systems reviewed and are negative.      Objective   Vitals:  /90   Pulse 85   Ht 1.753 m (5' 9\")   Wt 128 kg (283 lb)   BMI 41.79 kg/m²       Physical Exam  Vitals and nursing note reviewed.   Constitutional:       General: He is not in acute distress.     Appearance: Normal appearance. He is well-developed. He is obese. He is not toxic-appearing.   HENT:      Head: Normocephalic and atraumatic.      Right Ear: Tympanic membrane and external ear normal.      Left Ear: Tympanic membrane and external ear normal.      Nose: Nose normal.      Mouth/Throat:      Mouth: Mucous membranes are moist.      Pharynx: Oropharynx is clear. No oropharyngeal exudate or posterior oropharyngeal erythema.      Tonsils: No tonsillar exudate. 2+ on the right. 2+ on the left.   Eyes:    "   Extraocular Movements: Extraocular movements intact.      Conjunctiva/sclera: Conjunctivae normal.   Cardiovascular:      Rate and Rhythm: Normal rate and regular rhythm.      Pulses: Normal pulses.      Heart sounds: Normal heart sounds. No murmur heard.  Pulmonary:      Effort: Pulmonary effort is normal.      Breath sounds: Normal breath sounds.   Abdominal:      General: Abdomen is flat. Bowel sounds are normal.      Palpations: Abdomen is soft.   Musculoskeletal:      Cervical back: Neck supple.   Feet:      Right foot:      Skin integrity: Skin integrity normal. No ulcer, blister, skin breakdown, erythema, warmth or callus.      Toenail Condition: Right toenails are normal.      Left foot:      Skin integrity: Skin integrity normal. No ulcer, blister, skin breakdown, erythema, warmth or callus.      Toenail Condition: Left toenails are normal.   Lymphadenopathy:      Cervical: No cervical adenopathy.   Skin:     General: Skin is warm and dry.      Capillary Refill: Capillary refill takes more than 3 seconds.      Findings: No rash.   Neurological:      Mental Status: He is alert. Mental status is at baseline.      Sensory: Sensation is intact.   Psychiatric:         Mood and Affect: Mood normal.         Behavior: Behavior normal.         Thought Content: Thought content normal.         Judgment: Judgment normal.     Lifestyle Recommendations  I recommend a whole-food plant-based diet, an eating pattern that encourages the consumption of unrefined plant foods (such as fruits, vegetables, tubers, whole grains, legumes, nuts and seeds) and discourages meats, dairy products, eggs and processed foods.     The AHA/ACC recommends that the patient consume a dietary pattern that emphasizes intake of vegetables, fruits, and whole grains; includes low-fat dairy products, poultry, fish, legumes, non-tropical vegetable oils, and nuts; and limits intake of sodium, sweets, sugar-sweetened beverages, and red meats.  Adapt  this dietary pattern to appropriate calorie requirements (a 500-750 kcal/day deficit to loose weight), personal and cultural food preferences, and nutrition therapy for other medical conditions (including diabetes).  Achieve this pattern by following plans such as the [place] Mediterranean, DASH dietary pattern, or AHA diet.     Engage in 2 hours and 30 minutes per week of moderate-intensity physical activity, or 1 hour and 15 minutes (75 minutes) per week of vigorous-intensity aerobic physical activity, or an equivalent combination of moderate and vigorous-intensity aerobic physical activity. Aerobic activity should be performed in episodes of at least 10 minutes preferably spread throughout the week.     Adhering to a heart healthy diet, regular exercise habits, avoidance of tobacco products, and maintenance of a healthy weight are crucial components of their heart disease risk reduction.  This may not meet the criteria for a clinical depression diagnosis. Symptoms were reviewed with Joe.  Follow-up within the next 3 months is recommended to re-assess symptoms and monitor mental health status.     Assessment & Plan  Hypertension, benign  Persistently elevated continue losartan 100 mg daily increase amlodipine from 5 to 10 mg daily did not tolerate trial of thiazide diuretic in the past reevaluate in 3 months mild microalbuminuria noted but improved on Farxiga  Orders:    Follow Up In [facility] - PCP - Established    cyclobenzaprine (Flexeril) 10 mg tablet; Take 1 tablet (10 mg) by mouth 3 times a day as needed for muscle spasms.    amLODIPine (Norvasc) 10 mg tablet; Take 1 tablet (10 mg) by mouth once daily.    Follow Up In [facility] - PCP - Established; Future    Major depressive disorder with single episode, in full remission  Stable at this time  Orders:    cyclobenzaprine (Flexeril) 10 mg tablet; Take 1 tablet (10 mg) by mouth 3 times a day as needed for muscle spasms.    amLODIPine  (Norvasc) 10 mg tablet; Take 1 tablet (10 mg) by mouth once daily.    Follow Up [facility] - PCP - Established; Future    Prostate cancer screening  PSA stable continue with annual evaluation  Orders:    cyclobenzaprine (Flexeril) 10 mg tablet; Take 1 tablet (10 mg) by mouth 3 times a day as needed for muscle spasms.    amLODIPine (Norvasc) 10 mg tablet; Take 1 tablet (10 mg) by mouth once daily.    Follow Up [facility] - PCP - Established; Future    Type 2 diabetes mellitus with microalbuminuria, without long-term current use of insulin (Multi)  Patient with multiple risk factors yet insurance not covering semaglutide which is known to have significant improvements in reducing cardiovascular disease obstructive sleep apnea morbid obesity diabetes mellitus impaired glucose tolerance and insulin resistance patient high risk for cardiovascular events another plea has been made for patient to continue taking semaglutide 2 mg subcu weekly patient has substantial 30 pound weight loss with this medication with improvement in all his numbers and indices including blood pressure and sleep apnea.  It is against the standard of care the patient cannot obtain this medication to manage his diabetes given its significant benefits and his previous improvement while on the medication in the past should not be taken away from him a letter has been sent to his insurance company for serious consideration  Orders:    cyclobenzaprine (Flexeril) 10 mg tablet; Take 1 tablet (10 mg) by mouth 3 times a day as needed for muscle spasms.    amLODIPine (Norvasc) 10 mg tablet; Take 1 tablet (10 mg) by mouth once daily.    Follow Up In [facility] - PCP - Established; Future    Hyperlipidemia associated with type 2 diabetes mellitus  Optimal LDL cholesterol on rosuvastatin 10 mg daily continue  Orders:    cyclobenzaprine (Flexeril) 10 mg tablet; Take 1 tablet (10 mg) by mouth 3 times a day as needed for  muscle spasms.    amLODIPine (Norvasc) 10 mg tablet; Take 1 tablet (10 mg) by mouth once daily.    Follow Up In [facility] - PCP - Established; Future    PAPO on CPAP  Hampered by lack of use of semaglutide for weight loss with proven studies showing significant improvement in severe obstructive sleep apnea and reducing the risk of sudden cardiac death with the use of GLP-1 agonists and hypertensive heart disease particularly diastolic dysfunction patient compliant with regular use of CPAP nightly basis in the setting of obesity hypoventilation  Orders:    cyclobenzaprine (Flexeril) 10 mg tablet; Take 1 tablet (10 mg) by mouth 3 times a day as needed for muscle spasms.    amLODIPine (Norvasc) 10 mg tablet; Take 1 tablet (10 mg) by mouth once daily.    Follow Up In [facility] - PCP - Established; Future    Class 3 obesity with alveolar hypoventilation, serious comorbidity, and body mass index (BMI) of 40.0 to 44.9 in adult  Patient not doing as well without coverage of semaglutide 2 mg weekly with weight BMI increase of 38-41 despite improvements in overall blood sugar with dapagliflozin and metformin worsening of hypertension follow-up in 3 months

## 2025-06-26 NOTE — LETTER
Re: Appeal for Coverage of Ozempic (semaglutide)  Patient Name: Joe Lopez  YOB: 1970  Insurance ID:  [id number]   Diagnosis: Type 2 Diabetes Mellitus, Obesity (BMI 47.79), Cardiovascular Disease, Severe Obstructive Sleep Apnea     To Whom It May Concern:     I am writing on behalf of my patient, Joe Lopez, to request reconsideration for coverage of Ozempic (semaglutide) for the treatment of uncontrolled Type 2 diabetes mellitus and associated comorbidities. This medication is medically necessary for the effective management of my patient’s condition and has previously demonstrated significant benefit.     Clinical Justification:     Joe Lopez has a complex medical history, including:  Type 2 Diabetes Mellitus - currently uncontrolled despite standard treatment.  Severe Obstructive Sleep Apnea - contributing to poor metabolic control.  Cardiovascular Disease - placing the patient at higher risk of complications.  Obesity (BMI: 41.79) - worsening insulin resistance and overall metabolic profile.     During prior treatment with Ozempic, Joe experienced substantial improvement in both glycemic control and weight management, with a weight reduction of 30 pounds and improved A1c levels. Unfortunately, after discontinuation of the medication due to lack of coverage, the patient has experienced significant weight regain and deterioration in diabetes control, posing a serious risk to their overall health.     Rationale for Ozempic:     Ozempic is an evidence-based GLP-1 receptor agonist that is indicated for the management of Type 2 diabetes and has demonstrated cardiovascular benefit in high-risk populations. Given the patient’s multiple comorbidities, particularly obesity, sleep apnea, and cardiovascular disease, Ozempic represents the most appropriate and clinically supported treatment option. Other alternatives have been less effective or inappropriate due to side effects or lack of  weight-related benefit.     Without access to Ozempic, Joe is at serious risk for further disease progression, increased cardiovascular complications, and decreased quality of life. Continuation of this therapy is essential to reverse the current negative trajectory and reestablish metabolic control.     I respectfully request that you approve coverage for Ozempic for this patient based on the clear medical necessity and previous successful clinical response.     Please contact my office at [phone] for any additional information or documentation you may require.     Sincerely,     Jeremiah Mccabe,  DO

## 2025-06-26 NOTE — PROGRESS NOTES
"Subjective   Patient ID: Joe Lopez is a 55 y.o. male who presents for Follow-up and Back Pain.    HPI     Review of Systems    Objective   BP (!) 167/106   Pulse 85   Ht 1.753 m (5' 9\")   Wt 128 kg (283 lb)   BMI 41.79 kg/m²     Physical Exam    Assessment/Plan          "

## 2025-07-10 ENCOUNTER — TELEPHONE (OUTPATIENT)
Dept: PRIMARY CARE | Facility: CLINIC | Age: 55
End: 2025-07-10
Payer: COMMERCIAL

## 2025-07-10 NOTE — TELEPHONE ENCOUNTER
Discontinue the Ozempic for now.  Tell us which blood pressure medication is causing him to have side effects.  Start taking blood pressures at home and reporting for the next week and call in with blood pressures and schedule appointment next week for evaluation

## 2025-07-10 NOTE — TELEPHONE ENCOUNTER
Patient called in stating he was put on a BP medication and it is giving him muscle spasms on his side, stomach area. He was on this before and we gave him muscle relaxer. The muscle relaxer are no longer working. Patient quit taking both medications and he feels better. Is there a different BP medication he can try?  [facility] [place]    He states he can't take the Ozempic. Says it is making him really sick to the point of calling off work. He says he paid $300 for it and he can't take it. Is there something else he should try?  [facility] [place]

## 2025-07-22 ENCOUNTER — APPOINTMENT (OUTPATIENT)
Dept: PRIMARY CARE | Facility: CLINIC | Age: 55
End: 2025-07-22
Payer: COMMERCIAL

## 2025-07-22 VITALS
HEIGHT: 69 IN | HEART RATE: 72 BPM | WEIGHT: 283 LBS | DIASTOLIC BLOOD PRESSURE: 72 MMHG | SYSTOLIC BLOOD PRESSURE: 127 MMHG | BODY MASS INDEX: 41.92 KG/M2

## 2025-07-22 DIAGNOSIS — R80.9 TYPE 2 DIABETES MELLITUS WITH MICROALBUMINURIA, WITHOUT LONG-TERM CURRENT USE OF INSULIN (MULTI): Primary | ICD-10-CM

## 2025-07-22 DIAGNOSIS — E11.69 HYPERLIPIDEMIA ASSOCIATED WITH TYPE 2 DIABETES MELLITUS: ICD-10-CM

## 2025-07-22 DIAGNOSIS — E78.5 HYPERLIPIDEMIA ASSOCIATED WITH TYPE 2 DIABETES MELLITUS: ICD-10-CM

## 2025-07-22 DIAGNOSIS — G47.33 OSA ON CPAP: ICD-10-CM

## 2025-07-22 DIAGNOSIS — E11.29 TYPE 2 DIABETES MELLITUS WITH MICROALBUMINURIA, WITHOUT LONG-TERM CURRENT USE OF INSULIN (MULTI): Primary | ICD-10-CM

## 2025-07-22 DIAGNOSIS — I10 HYPERTENSION, BENIGN: ICD-10-CM

## 2025-07-22 DIAGNOSIS — F17.220 CHEWING TOBACCO NICOTINE DEPENDENCE WITHOUT COMPLICATION: ICD-10-CM

## 2025-07-22 DIAGNOSIS — J45.40 MODERATE PERSISTENT ASTHMA WITHOUT COMPLICATION (HHS-HCC): ICD-10-CM

## 2025-07-22 PROCEDURE — 3008F BODY MASS INDEX DOCD: CPT | Performed by: INTERNAL MEDICINE

## 2025-07-22 PROCEDURE — 3074F SYST BP LT 130 MM HG: CPT | Performed by: INTERNAL MEDICINE

## 2025-07-22 PROCEDURE — 99213 OFFICE O/P EST LOW 20 MIN: CPT | Performed by: INTERNAL MEDICINE

## 2025-07-22 PROCEDURE — 4010F ACE/ARB THERAPY RXD/TAKEN: CPT | Performed by: INTERNAL MEDICINE

## 2025-07-22 PROCEDURE — 3078F DIAST BP <80 MM HG: CPT | Performed by: INTERNAL MEDICINE

## 2025-07-22 RX ORDER — ALBUTEROL SULFATE 90 UG/1
2 INHALANT RESPIRATORY (INHALATION) EVERY 4 HOURS PRN
Qty: 8 G | Refills: 5 | Status: SHIPPED | OUTPATIENT
Start: 2025-07-22 | End: 2026-07-22

## 2025-07-22 RX ORDER — BUDESONIDE AND FORMOTEROL FUMARATE DIHYDRATE 160; 4.5 UG/1; UG/1
2 AEROSOL RESPIRATORY (INHALATION)
Qty: 10.2 G | Refills: 5 | Status: SHIPPED | OUTPATIENT
Start: 2025-07-22 | End: 2025-07-22 | Stop reason: SDUPTHER

## 2025-07-22 RX ORDER — TIRZEPATIDE 2.5 MG/.5ML
2.5 INJECTION, SOLUTION SUBCUTANEOUS WEEKLY
Qty: 4 PEN | Refills: 0 | Status: SHIPPED | OUTPATIENT
Start: 2025-07-22 | End: 2025-08-21

## 2025-07-22 RX ORDER — BUDESONIDE AND FORMOTEROL FUMARATE DIHYDRATE 160; 4.5 UG/1; UG/1
2 AEROSOL RESPIRATORY (INHALATION)
Qty: 10.2 G | Refills: 5 | Status: SHIPPED | OUTPATIENT
Start: 2025-07-22 | End: 2026-07-22

## 2025-07-22 RX ORDER — ALBUTEROL SULFATE 90 UG/1
2 INHALANT RESPIRATORY (INHALATION) EVERY 4 HOURS PRN
Qty: 8 G | Refills: 5 | Status: SHIPPED | OUTPATIENT
Start: 2025-07-22 | End: 2025-07-22 | Stop reason: SDUPTHER

## 2025-07-22 ASSESSMENT — ENCOUNTER SYMPTOMS: HYPERTENSION: 1

## 2025-07-22 NOTE — ASSESSMENT & PLAN NOTE
Continue rosuvastatin 10 mg nightly  Orders:    Referral to Clinical Pharmacy; Future    Follow Up In [facility] - PCP - Established; Future    Comprehensive Metabolic Panel; Future    Hemoglobin A1C; Future    Lipid Panel; Future    Albumin-Creatinine Ratio, Urine Random; Future

## 2025-07-22 NOTE — ASSESSMENT & PLAN NOTE
Patient discontinued amlodipine due to significant side effects aggravated by significant orthostasis due to environmental causes and circumstance continue losartan 100 mg daily blood pressure readings at home have been optimal stable no evidence of endorgan disease reevaluate 3 months

## 2025-07-22 NOTE — PROGRESS NOTES
"Subjective   Patient ID: Joe Lopez is a 55 y.o. male who presents for Hypertension (Did stop the Amlodipine due to muscle spasms ).  Also stopped ozempic states it made him sick   HPI     Review of Systems    Objective   /90   Pulse 72   Ht 1.753 m (5' 9\")   Wt 128 kg (283 lb)   BMI 41.79 kg/m²     Physical Exam    Assessment/Plan          "

## 2025-07-22 NOTE — ASSESSMENT & PLAN NOTE
Hemoglobin A1c improved to 6.0 however patient reporting major side effects GI with semaglutide 2 mg switched to tirzepatide 2.5 mg with pharmacy consultation for monitoring careful administration A1c 6.0 at this time reevaluate next visit continue metformin and Farxiga  Orders:    tirzepatide (Mounjaro) 2.5 mg/0.5 mL pen injector; Inject 2.5 mg under the skin 1 (one) time per week.    Referral to Clinical Pharmacy; Future    Follow Up In [facility] - PCP - Established; Future    Comprehensive Metabolic Panel; Future    Hemoglobin A1C; Future    Lipid Panel; Future    Albumin-Creatinine Ratio, Urine Random; Future

## 2025-07-22 NOTE — ASSESSMENT & PLAN NOTE
Continue to promote reduction and abstinence.  Could be contributing to elevated blood pressures

## 2025-07-22 NOTE — PROGRESS NOTES
Subjective   Joe Lopez is a 55 y.o. male who presents for Hypertension (Did stop the Amlodipine due to muscle spasms ).  Hypertension  This is a chronic problem. The current episode started more than 1 year ago. The problem has been gradually improving since onset. Associated symptoms include malaise/fatigue and peripheral edema. There are no associated agents to hypertension. Risk factors for coronary artery disease include diabetes mellitus, dyslipidemia, family history, male gender, obesity and stress. Past treatments include angiotensin blockers, diuretics, lifestyle changes and ACE inhibitors. The current treatment provides significant improvement. Compliance problems include medication side effects, medication cost and diet.  Identifiable causes of hypertension include sleep apnea.     This is a diabetes follow up visit for Joe Lopez. The current treatment includes diet, exercise, oral medications, and GLP 1 injections and he is compliant all of the time. Symptoms include none. Glucose is monitored: not checking. he reports fair compliance with a low carbohydrate diet, and exercises daily.    Patient is prescribed an ACE/ARB/ARNI medication   Patient is prescribed a STATIN medication  Patient is prescribed a SGLT2/GLP1 medication    Last Flu Vaccine given:            10/03/2022   Last PCV Vaccine given:   08/02/2024    Lab Results   Component Value Date    HGBA1C 6.0 (H) 06/09/2025    CREATININE 0.76 06/09/2025    MICROALBCREA 36 (H) 06/09/2025    LDLCALC 74 06/09/2025        Last Eye Exam: patient reports annual screening by optometry/opthalmology     Liver Screening: Computed FIB-4 Calculation unavailable. One or more values for this score either were not found within the given timeframe or did not fit some other criterion.    Interpretation: <1.45 Cirrhosis less likely, 1.45 - 3.25 Indeterminate, >3.25 Cirrhosis more likely    Review of Systems   Constitutional:  Positive for malaise/fatigue.    All other systems reviewed and are negative.    Visit Vitals  /72   Pulse 72   Body mass index is 41.79 kg/m².   Objective   Physical Exam  Vitals and nursing note reviewed.   Constitutional:       General: He is not in acute distress.     Appearance: Normal appearance. He is well-developed. He is obese. He is not toxic-appearing.   HENT:      Head: Normocephalic and atraumatic.      Right Ear: Tympanic membrane and external ear normal.      Left Ear: Tympanic membrane and external ear normal.      Nose: Nose normal.      Mouth/Throat:      Mouth: Mucous membranes are moist.      Pharynx: Oropharynx is clear. No oropharyngeal exudate or posterior oropharyngeal erythema.      Tonsils: No tonsillar exudate. 2+ on the right. 2+ on the left.     Eyes:      Extraocular Movements: Extraocular movements intact.      Conjunctiva/sclera: Conjunctivae normal.       Cardiovascular:      Rate and Rhythm: Normal rate and regular rhythm.      Pulses: Normal pulses.      Heart sounds: Normal heart sounds. No murmur heard.  Pulmonary:      Effort: Pulmonary effort is normal.      Breath sounds: Normal breath sounds.   Abdominal:      General: Abdomen is flat. Bowel sounds are normal.      Palpations: Abdomen is soft.     Musculoskeletal:      Cervical back: Neck supple.   Feet:      Right foot:      Skin integrity: Skin integrity normal. No ulcer, blister, skin breakdown, erythema, warmth or callus.      Toenail Condition: Right toenails are normal.      Left foot:      Skin integrity: Skin integrity normal. No ulcer, blister, skin breakdown, erythema, warmth or callus.      Toenail Condition: Left toenails are normal.   Lymphadenopathy:      Cervical: No cervical adenopathy.     Skin:     General: Skin is warm and dry.      Capillary Refill: Capillary refill takes more than 3 seconds.      Findings: No rash.     Neurological:      Mental Status: He is alert. Mental status is at baseline.      Sensory: Sensation is intact.      Psychiatric:         Mood and Affect: Mood normal.         Behavior: Behavior normal.         Thought Content: Thought content normal.         Judgment: Judgment normal.         Normal BILATERAL diabetic foot exam: Pulses are palpable, sensation is intact, and there are no ulcers or lesions.     Assessment & Plan  Moderate persistent asthma without complication ([facility])  Patient with flare of respiratory symptoms related to hayfever working with hay refilled the albuterol and Symbicort encourage patient to take 2 puffs twice a day reevaluate next visit 3 months  Orders:    Referral to Clinical Pharmacy; Future    Follow Up In [facility] - PCP - Established; Future    Comprehensive Metabolic Panel; Future    Hemoglobin A1C; Future    Lipid Panel; Future    Albumin-Creatinine Ratio, Urine Random; Future    budesonide-formoterol (Symbicort) 160-4.5 mcg/actuation inhaler; Inhale 2 puffs 2 times a day. Rinse mouth with water after use to reduce aftertaste and incidence of candidiasis. Do not swallow.    albuterol (Ventolin HFA) 90 mcg/actuation inhaler; Inhale 2 puffs every 4 hours if needed for wheezing or shortness of breath.    Type 2 diabetes mellitus with microalbuminuria, without long-term current use of insulin (Multi)  Hemoglobin A1c improved to 6.0 however patient reporting major side effects GI with semaglutide 2 mg switched to tirzepatide 2.5 mg with pharmacy consultation for monitoring careful administration A1c 6.0 at this time reevaluate next visit continue metformin and Farxiga  Orders:    tirzepatide (Mounjaro) 2.5 mg/0.5 mL pen injector; Inject 2.5 mg under the skin 1 (one) time per week.    Referral to Clinical Pharmacy; Future    Follow Up In [facility] - PCP - Established; Future    Comprehensive Metabolic Panel; Future    Hemoglobin A1C; Future    Lipid Panel; Future    Albumin-Creatinine Ratio, Urine Random; Future    Hyperlipidemia associated with type 2 diabetes  mellitus  Continue rosuvastatin 10 mg nightly  Orders:    Referral to Clinical Pharmacy; Future    Follow Up In [facility] - PCP - Established; Future    Comprehensive Metabolic Panel; Future    Hemoglobin A1C; Future    Lipid Panel; Future    Albumin-Creatinine Ratio, Urine Random; Future    Hypertension, benign  Patient discontinued amlodipine due to significant side effects aggravated by significant orthostasis due to environmental causes and circumstance continue losartan 100 mg daily blood pressure readings at home have been optimal stable no evidence of endorgan disease reevaluate 3 months       Chewing tobacco nicotine dependence without complication  Continue to promote reduction and abstinence.  Could be contributing to elevated blood pressures     PAPO on CPAP  Continue with regular nightly use of CPAP effective                 Jeremiah Mccabe DO 07/22/25 2:56 PM

## 2025-08-03 DIAGNOSIS — E11.29 TYPE 2 DIABETES MELLITUS WITH MICROALBUMINURIA, WITHOUT LONG-TERM CURRENT USE OF INSULIN (MULTI): ICD-10-CM

## 2025-08-03 DIAGNOSIS — R80.9 TYPE 2 DIABETES MELLITUS WITH MICROALBUMINURIA, WITHOUT LONG-TERM CURRENT USE OF INSULIN (MULTI): ICD-10-CM

## 2025-08-04 RX ORDER — TIRZEPATIDE 2.5 MG/.5ML
2.5 INJECTION, SOLUTION SUBCUTANEOUS
Qty: 4 PEN | Refills: 11 | Status: SHIPPED | OUTPATIENT
Start: 2025-08-10

## 2025-08-11 ENCOUNTER — TELEPHONE (OUTPATIENT)
Dept: PRIMARY CARE | Facility: CLINIC | Age: 55
End: 2025-08-11
Payer: COMMERCIAL

## 2025-10-02 ENCOUNTER — APPOINTMENT (OUTPATIENT)
Dept: PRIMARY CARE | Facility: CLINIC | Age: 55
End: 2025-10-02
Payer: COMMERCIAL

## 2025-11-03 ENCOUNTER — APPOINTMENT (OUTPATIENT)
Dept: PRIMARY CARE | Facility: CLINIC | Age: 55
End: 2025-11-03
Payer: COMMERCIAL